# Patient Record
Sex: MALE | Race: WHITE | Employment: UNEMPLOYED | URBAN - METROPOLITAN AREA
[De-identification: names, ages, dates, MRNs, and addresses within clinical notes are randomized per-mention and may not be internally consistent; named-entity substitution may affect disease eponyms.]

---

## 2017-02-06 PROBLEM — N17.9 ACUTE RENAL FAILURE (ARF) (HCC): Status: ACTIVE | Noted: 2017-02-06

## 2022-11-07 ENCOUNTER — HOSPITAL ENCOUNTER (INPATIENT)
Age: 52
LOS: 2 days | Discharge: HOME | DRG: 192 | End: 2022-11-09
Attending: EMERGENCY MEDICINE | Admitting: INTERNAL MEDICINE
Payer: MEDICAID

## 2022-11-07 ENCOUNTER — APPOINTMENT (OUTPATIENT)
Dept: CT IMAGING | Age: 52
DRG: 192 | End: 2022-11-07
Payer: MEDICAID

## 2022-11-07 ENCOUNTER — APPOINTMENT (OUTPATIENT)
Dept: GENERAL RADIOLOGY | Age: 52
DRG: 192 | End: 2022-11-07
Payer: MEDICAID

## 2022-11-07 DIAGNOSIS — E87.20 LACTIC ACIDOSIS: ICD-10-CM

## 2022-11-07 DIAGNOSIS — I21.4 NSTEMI (NON-ST ELEVATED MYOCARDIAL INFARCTION) (HCC): ICD-10-CM

## 2022-11-07 DIAGNOSIS — D72.829 LEUKOCYTOSIS, UNSPECIFIED TYPE: ICD-10-CM

## 2022-11-07 DIAGNOSIS — R41.82 ALTERED MENTAL STATUS, UNSPECIFIED ALTERED MENTAL STATUS TYPE: Primary | ICD-10-CM

## 2022-11-07 PROBLEM — R55 SYNCOPE AND COLLAPSE: Status: ACTIVE | Noted: 2022-11-07

## 2022-11-07 LAB
ACANTHOCYTES: ABNORMAL
ACETAMINOPHEN LEVEL: <5 MCG/ML (ref 10–30)
ALBUMIN SERPL-MCNC: 5.3 G/DL (ref 3.5–5.2)
ALP BLD-CCNC: 116 U/L (ref 40–129)
ALT SERPL-CCNC: 18 U/L (ref 0–40)
AMPHETAMINE SCREEN, URINE: NOT DETECTED
ANION GAP SERPL CALCULATED.3IONS-SCNC: 22 MMOL/L (ref 7–16)
ANISOCYTOSIS: ABNORMAL
ANISOCYTOSIS: ABNORMAL
AST SERPL-CCNC: 23 U/L (ref 0–39)
BARBITURATE SCREEN URINE: NOT DETECTED
BASOPHILS ABSOLUTE: 0 E9/L (ref 0–0.2)
BASOPHILS ABSOLUTE: 0.04 E9/L (ref 0–0.2)
BASOPHILS RELATIVE PERCENT: 0.2 % (ref 0–2)
BASOPHILS RELATIVE PERCENT: 0.4 % (ref 0–2)
BENZODIAZEPINE SCREEN, URINE: NOT DETECTED
BETA-HYDROXYBUTYRATE: 0.86 MMOL/L (ref 0.02–0.27)
BILIRUB SERPL-MCNC: <0.2 MG/DL (ref 0–1.2)
BUN BLDV-MCNC: 24 MG/DL (ref 6–20)
BURR CELLS: ABNORMAL
BURR CELLS: ABNORMAL
CALCIUM SERPL-MCNC: 10.3 MG/DL (ref 8.6–10.2)
CANNABINOID SCREEN URINE: POSITIVE
CHLORIDE BLD-SCNC: 103 MMOL/L (ref 98–107)
CO2: 20 MMOL/L (ref 22–29)
COCAINE METABOLITE SCREEN URINE: POSITIVE
CREAT SERPL-MCNC: 1.2 MG/DL (ref 0.7–1.2)
EOSINOPHILS ABSOLUTE: 0 E9/L (ref 0.05–0.5)
EOSINOPHILS ABSOLUTE: 0.01 E9/L (ref 0.05–0.5)
EOSINOPHILS RELATIVE PERCENT: 0 % (ref 0–6)
EOSINOPHILS RELATIVE PERCENT: 0.2 % (ref 0–6)
ETHANOL: <10 MG/DL (ref 0–0.08)
FENTANYL SCREEN, URINE: POSITIVE
GFR SERPL CREATININE-BSD FRML MDRD: >60 ML/MIN/1.73
GLUCOSE BLD-MCNC: 199 MG/DL (ref 74–99)
HCT VFR BLD CALC: 43.8 % (ref 37–54)
HCT VFR BLD CALC: 48.8 % (ref 37–54)
HEMOGLOBIN: 14.8 G/DL (ref 12.5–16.5)
HEMOGLOBIN: 15.9 G/DL (ref 12.5–16.5)
HOWELL-JOLLY BODIES: ABNORMAL
IMMATURE GRANULOCYTES #: 0.2 E9/L
IMMATURE GRANULOCYTES %: 0.9 % (ref 0–5)
LACTIC ACID, SEPSIS: 11.1 MMOL/L (ref 0.5–1.9)
LACTIC ACID, SEPSIS: 2 MMOL/L (ref 0.5–1.9)
LACTIC ACID: 1.7 MMOL/L (ref 0.5–2.2)
LYMPHOCYTES ABSOLUTE: 1.02 E9/L (ref 1.5–4)
LYMPHOCYTES ABSOLUTE: 2.55 E9/L (ref 1.5–4)
LYMPHOCYTES RELATIVE PERCENT: 4.6 % (ref 20–42)
LYMPHOCYTES RELATIVE PERCENT: 9.6 % (ref 20–42)
Lab: ABNORMAL
MAGNESIUM: 2.3 MG/DL (ref 1.6–2.6)
MCH RBC QN AUTO: 32 PG (ref 26–35)
MCH RBC QN AUTO: 32.4 PG (ref 26–35)
MCHC RBC AUTO-ENTMCNC: 32.6 % (ref 32–34.5)
MCHC RBC AUTO-ENTMCNC: 33.8 % (ref 32–34.5)
MCV RBC AUTO: 94.6 FL (ref 80–99.9)
MCV RBC AUTO: 99.4 FL (ref 80–99.9)
METHADONE SCREEN, URINE: NOT DETECTED
MONOCYTES ABSOLUTE: 1.52 E9/L (ref 0.1–0.95)
MONOCYTES ABSOLUTE: 2.04 E9/L (ref 0.1–0.95)
MONOCYTES RELATIVE PERCENT: 6.8 % (ref 2–12)
MONOCYTES RELATIVE PERCENT: 7.8 % (ref 2–12)
NEUTROPHILS ABSOLUTE: 19.42 E9/L (ref 1.8–7.3)
NEUTROPHILS ABSOLUTE: 21.17 E9/L (ref 1.8–7.3)
NEUTROPHILS RELATIVE PERCENT: 82.6 % (ref 43–80)
NEUTROPHILS RELATIVE PERCENT: 87.5 % (ref 43–80)
OPIATE SCREEN URINE: NOT DETECTED
OSMOLALITY: 305 MOSM/KG (ref 285–310)
OVALOCYTES: ABNORMAL
OXYCODONE URINE: NOT DETECTED
PAPPENHEIMER BODIES: ABNORMAL
PDW BLD-RTO: 15 FL (ref 11.5–15)
PDW BLD-RTO: 15.2 FL (ref 11.5–15)
PHENCYCLIDINE SCREEN URINE: NOT DETECTED
PLATELET # BLD: 379 E9/L (ref 130–450)
PLATELET # BLD: 430 E9/L (ref 130–450)
PMV BLD AUTO: 9.1 FL (ref 7–12)
PMV BLD AUTO: 9.9 FL (ref 7–12)
POIKILOCYTES: ABNORMAL
POIKILOCYTES: ABNORMAL
POTASSIUM REFLEX MAGNESIUM: 4.6 MMOL/L (ref 3.5–5)
PROCALCITONIN: 0.78 NG/ML (ref 0–0.08)
RBC # BLD: 4.63 E12/L (ref 3.8–5.8)
RBC # BLD: 4.91 E12/L (ref 3.8–5.8)
SALICYLATE, SERUM: <0.3 MG/DL (ref 0–30)
SODIUM BLD-SCNC: 145 MMOL/L (ref 132–146)
TARGET CELLS: ABNORMAL
TARGET CELLS: ABNORMAL
TOTAL CK: 189 U/L (ref 20–200)
TOTAL PROTEIN: 8.7 G/DL (ref 6.4–8.3)
TRICYCLIC ANTIDEPRESSANTS SCREEN SERUM: NEGATIVE NG/ML
TROPONIN, HIGH SENSITIVITY: 121 NG/L (ref 0–11)
TROPONIN, HIGH SENSITIVITY: 79 NG/L (ref 0–11)
TSH SERPL DL<=0.05 MIU/L-ACNC: 0.43 UIU/ML (ref 0.27–4.2)
WBC # BLD: 22.2 E9/L (ref 4.5–11.5)
WBC # BLD: 25.5 E9/L (ref 4.5–11.5)

## 2022-11-07 PROCEDURE — 6370000000 HC RX 637 (ALT 250 FOR IP): Performed by: INTERNAL MEDICINE

## 2022-11-07 PROCEDURE — 6360000002 HC RX W HCPCS: Performed by: EMERGENCY MEDICINE

## 2022-11-07 PROCEDURE — 2140000000 HC CCU INTERMEDIATE R&B

## 2022-11-07 PROCEDURE — 83605 ASSAY OF LACTIC ACID: CPT

## 2022-11-07 PROCEDURE — 84145 PROCALCITONIN (PCT): CPT

## 2022-11-07 PROCEDURE — 80179 DRUG ASSAY SALICYLATE: CPT

## 2022-11-07 PROCEDURE — 6360000002 HC RX W HCPCS: Performed by: INTERNAL MEDICINE

## 2022-11-07 PROCEDURE — 80053 COMPREHEN METABOLIC PANEL: CPT

## 2022-11-07 PROCEDURE — 80143 DRUG ASSAY ACETAMINOPHEN: CPT

## 2022-11-07 PROCEDURE — 93005 ELECTROCARDIOGRAM TRACING: CPT | Performed by: EMERGENCY MEDICINE

## 2022-11-07 PROCEDURE — APPSS60 APP SPLIT SHARED TIME 46-60 MINUTES: Performed by: CLINICAL NURSE SPECIALIST

## 2022-11-07 PROCEDURE — 82550 ASSAY OF CK (CPK): CPT

## 2022-11-07 PROCEDURE — 83930 ASSAY OF BLOOD OSMOLALITY: CPT

## 2022-11-07 PROCEDURE — 70450 CT HEAD/BRAIN W/O DYE: CPT

## 2022-11-07 PROCEDURE — 93005 ELECTROCARDIOGRAM TRACING: CPT | Performed by: INTERNAL MEDICINE

## 2022-11-07 PROCEDURE — 99223 1ST HOSP IP/OBS HIGH 75: CPT | Performed by: INTERNAL MEDICINE

## 2022-11-07 PROCEDURE — 71045 X-RAY EXAM CHEST 1 VIEW: CPT

## 2022-11-07 PROCEDURE — 36415 COLL VENOUS BLD VENIPUNCTURE: CPT

## 2022-11-07 PROCEDURE — 84443 ASSAY THYROID STIM HORMONE: CPT

## 2022-11-07 PROCEDURE — 82010 KETONE BODYS QUAN: CPT

## 2022-11-07 PROCEDURE — 85025 COMPLETE CBC W/AUTO DIFF WBC: CPT

## 2022-11-07 PROCEDURE — 80307 DRUG TEST PRSMV CHEM ANLYZR: CPT

## 2022-11-07 PROCEDURE — 96361 HYDRATE IV INFUSION ADD-ON: CPT

## 2022-11-07 PROCEDURE — 87081 CULTURE SCREEN ONLY: CPT

## 2022-11-07 PROCEDURE — 82077 ASSAY SPEC XCP UR&BREATH IA: CPT

## 2022-11-07 PROCEDURE — 84484 ASSAY OF TROPONIN QUANT: CPT

## 2022-11-07 PROCEDURE — 99285 EMERGENCY DEPT VISIT HI MDM: CPT

## 2022-11-07 PROCEDURE — 87040 BLOOD CULTURE FOR BACTERIA: CPT

## 2022-11-07 PROCEDURE — 2580000003 HC RX 258: Performed by: INTERNAL MEDICINE

## 2022-11-07 PROCEDURE — 83735 ASSAY OF MAGNESIUM: CPT

## 2022-11-07 PROCEDURE — 96365 THER/PROPH/DIAG IV INF INIT: CPT

## 2022-11-07 PROCEDURE — 2580000003 HC RX 258: Performed by: EMERGENCY MEDICINE

## 2022-11-07 RX ORDER — SODIUM CHLORIDE 9 MG/ML
INJECTION, SOLUTION INTRAVENOUS PRN
Status: DISCONTINUED | OUTPATIENT
Start: 2022-11-07 | End: 2022-11-09 | Stop reason: HOSPADM

## 2022-11-07 RX ORDER — ACETAMINOPHEN 325 MG/1
650 TABLET ORAL EVERY 6 HOURS PRN
Status: DISCONTINUED | OUTPATIENT
Start: 2022-11-07 | End: 2022-11-08 | Stop reason: SDUPTHER

## 2022-11-07 RX ORDER — SODIUM CHLORIDE 0.9 % (FLUSH) 0.9 %
10 SYRINGE (ML) INJECTION PRN
Status: DISCONTINUED | OUTPATIENT
Start: 2022-11-07 | End: 2022-11-09 | Stop reason: HOSPADM

## 2022-11-07 RX ORDER — LANOLIN ALCOHOL/MO/W.PET/CERES
100 CREAM (GRAM) TOPICAL DAILY
Status: DISCONTINUED | OUTPATIENT
Start: 2022-11-07 | End: 2022-11-09 | Stop reason: HOSPADM

## 2022-11-07 RX ORDER — SODIUM CHLORIDE 0.9 % (FLUSH) 0.9 %
5-40 SYRINGE (ML) INJECTION EVERY 12 HOURS SCHEDULED
Status: DISCONTINUED | OUTPATIENT
Start: 2022-11-07 | End: 2022-11-07 | Stop reason: SDUPTHER

## 2022-11-07 RX ORDER — DEXTROSE MONOHYDRATE 100 MG/ML
INJECTION, SOLUTION INTRAVENOUS CONTINUOUS PRN
Status: DISCONTINUED | OUTPATIENT
Start: 2022-11-07 | End: 2022-11-09 | Stop reason: HOSPADM

## 2022-11-07 RX ORDER — SODIUM CHLORIDE 9 MG/ML
INJECTION, SOLUTION INTRAVENOUS PRN
Status: DISCONTINUED | OUTPATIENT
Start: 2022-11-07 | End: 2022-11-07 | Stop reason: SDUPTHER

## 2022-11-07 RX ORDER — 0.9 % SODIUM CHLORIDE 0.9 %
1000 INTRAVENOUS SOLUTION INTRAVENOUS ONCE
Status: COMPLETED | OUTPATIENT
Start: 2022-11-07 | End: 2022-11-07

## 2022-11-07 RX ORDER — POLYETHYLENE GLYCOL 3350 17 G/17G
17 POWDER, FOR SOLUTION ORAL DAILY PRN
Status: DISCONTINUED | OUTPATIENT
Start: 2022-11-07 | End: 2022-11-09 | Stop reason: HOSPADM

## 2022-11-07 RX ORDER — ONDANSETRON 2 MG/ML
4 INJECTION INTRAMUSCULAR; INTRAVENOUS EVERY 6 HOURS PRN
Status: DISCONTINUED | OUTPATIENT
Start: 2022-11-07 | End: 2022-11-09 | Stop reason: HOSPADM

## 2022-11-07 RX ORDER — ENOXAPARIN SODIUM 100 MG/ML
40 INJECTION SUBCUTANEOUS DAILY
Status: DISCONTINUED | OUTPATIENT
Start: 2022-11-07 | End: 2022-11-09 | Stop reason: HOSPADM

## 2022-11-07 RX ORDER — ACETAMINOPHEN 650 MG/1
650 SUPPOSITORY RECTAL EVERY 6 HOURS PRN
Status: DISCONTINUED | OUTPATIENT
Start: 2022-11-07 | End: 2022-11-09 | Stop reason: HOSPADM

## 2022-11-07 RX ORDER — SODIUM CHLORIDE 0.9 % (FLUSH) 0.9 %
10 SYRINGE (ML) INJECTION EVERY 12 HOURS SCHEDULED
Status: DISCONTINUED | OUTPATIENT
Start: 2022-11-07 | End: 2022-11-09 | Stop reason: HOSPADM

## 2022-11-07 RX ORDER — SODIUM CHLORIDE, SODIUM LACTATE, POTASSIUM CHLORIDE, AND CALCIUM CHLORIDE .6; .31; .03; .02 G/100ML; G/100ML; G/100ML; G/100ML
30 INJECTION, SOLUTION INTRAVENOUS ONCE
Status: DISCONTINUED | OUTPATIENT
Start: 2022-11-07 | End: 2022-11-09 | Stop reason: HOSPADM

## 2022-11-07 RX ORDER — PROMETHAZINE HYDROCHLORIDE 12.5 MG/1
12.5 TABLET ORAL EVERY 6 HOURS PRN
Status: DISCONTINUED | OUTPATIENT
Start: 2022-11-07 | End: 2022-11-09 | Stop reason: HOSPADM

## 2022-11-07 RX ORDER — IPRATROPIUM BROMIDE AND ALBUTEROL SULFATE 2.5; .5 MG/3ML; MG/3ML
1 SOLUTION RESPIRATORY (INHALATION)
Status: DISCONTINUED | OUTPATIENT
Start: 2022-11-07 | End: 2022-11-09 | Stop reason: HOSPADM

## 2022-11-07 RX ORDER — SODIUM CHLORIDE 0.9 % (FLUSH) 0.9 %
5-40 SYRINGE (ML) INJECTION PRN
Status: DISCONTINUED | OUTPATIENT
Start: 2022-11-07 | End: 2022-11-07 | Stop reason: SDUPTHER

## 2022-11-07 RX ADMIN — SODIUM CHLORIDE, PRESERVATIVE FREE 10 ML: 5 INJECTION INTRAVENOUS at 21:15

## 2022-11-07 RX ADMIN — Medication 100 MG: at 18:51

## 2022-11-07 RX ADMIN — MEROPENEM 1000 MG: 1 INJECTION, POWDER, FOR SOLUTION INTRAVENOUS at 21:16

## 2022-11-07 RX ADMIN — ENOXAPARIN SODIUM 40 MG: 100 INJECTION SUBCUTANEOUS at 18:51

## 2022-11-07 RX ADMIN — SODIUM CHLORIDE 1000 ML: 9 INJECTION, SOLUTION INTRAVENOUS at 12:45

## 2022-11-07 RX ADMIN — MEROPENEM 1000 MG: 1 INJECTION, POWDER, FOR SOLUTION INTRAVENOUS at 14:06

## 2022-11-07 ASSESSMENT — PAIN SCALES - GENERAL
PAINLEVEL_OUTOF10: 0
PAINLEVEL_OUTOF10: 0

## 2022-11-07 ASSESSMENT — PAIN - FUNCTIONAL ASSESSMENT: PAIN_FUNCTIONAL_ASSESSMENT: NONE - DENIES PAIN

## 2022-11-07 NOTE — PROGRESS NOTES
Chrissie Angelucci  11/7/2022    HPI:  Found down outside house. He has no recollection. He denies chest pressure/pain, dyspnea or dizziness. Mother found him he was glassy eyed, confused. Physical Exam  Vitals reviewed. Constitutional:       General: He is not in acute distress. Appearance: He is normal weight. HENT:      Head: Normocephalic and atraumatic. Eyes:      Pupils: Pupils are equal, round, and reactive to light. Cardiovascular:      Rate and Rhythm: Normal rate and regular rhythm. Heart sounds: No murmur heard. Pulmonary:      Breath sounds: Wheezing and rhonchi present. Musculoskeletal:      Right lower leg: No edema. Left lower leg: No edema. Neurological:      General: No focal deficit present. Mental Status: He is alert and oriented to person, place, and time. Mental status is at baseline. Cranial Nerves: No cranial nerve deficit.        A:  Cardio-Pulmonary Collapse   Elevated troponin  Abnormal ECG  Hypercalcemia  Lactic Acidemia  Tobacco Use    P:  Serial troponin  Re-evaluate electrolytes  ECHO  Stress Test versus Heart Catheterization

## 2022-11-07 NOTE — PROGRESS NOTES
Patient states he is missing one shoe, call made down to ER to see if patient's show is there, it is not. Another call placed to transport department to see if the shoe is on the cart patient was brought up on. Patient and mother updated.

## 2022-11-07 NOTE — ED PROVIDER NOTES
Department of Emergency Medicine   ED  Provider Note  Admit Date/RoomTime: 11/7/2022 10:03 AM  ED Room: 6507/6507-A          History of Present Illness:  11/7/22, Time: 9:24 AM EST  Chief Complaint   Patient presents with    Alcohol Intoxication     Found outside by family pt admits to drinking stating he was sitting on front porch                Robel Brar is a 47 y.o. male presenting to the ED for being found unresponsive, beginning prior to arrival.  The complaint has been intermittent, severe in severity, and worsened by nothing. Says he had 3 shots and a beer. Says he also smoked marijuana. Says he then drove home. He remembers getting out of his car and that is it. He reports his mother found him outside today. He denies toxic alcohol use. He denies chest pain or shortness of breath. No abdominal pain. Denies cardiac history. Denies recent illness. No family hx of sudden cardiac death or syncooe    Review of Systems:   A complete review of systems was performed and pertinent positives and negatives are stated within HPI, all other systems reviewed and are negative.        --------------------------------------------- PAST HISTORY ---------------------------------------------  Past Medical History:  has a past medical history of Chronic back pain and Herniated disc. Past Surgical History:  has a past surgical history that includes hernia repair (1974); laminectomy (10/11/2011); and other surgical history (2/9/16). Social History:  reports that he has been smoking cigars. He has been smoking an average of .25 packs per day. He has never used smokeless tobacco. He reports current alcohol use of about 2.0 standard drinks per week. He reports that he does not use drugs. Family History: family history includes Diabetes in his father; Heart Disease in his mother; High Blood Pressure in his father and mother; Thyroid Disease in his mother. . Unless otherwise noted, family history is non contributory    The patients home medications have been reviewed. Allergies: Penicillins    I have reviewed the past medical history, past surgical history, social history, and family history    ---------------------------------------------------PHYSICAL EXAM--------------------------------------    Constitutional/General: Alert and oriented x3  Head: Normocephalic and atraumatic  Eyes: PERRL, EOMI, sclera non icteric  ENT: Oropharynx clear, handling secretions, no trismus, no asymmetry of the posterior oropharynx or uvular edema  Neck: Supple, full ROM, no stridor, no meningeal signs  Respiratory: Lungs clear to auscultation bilaterally, no wheezes, rales, or rhonchi. Not in respiratory distress  Cardiovascular:  Regular rate. Regular rhythm. No murmurs, no gallops, no rubs. 2+ distal pulses. Equal extremity pulses. Gastrointestinal:  Abdomen Soft, Non tender, Non distended. No rebound, guarding, or rigidity. No pulsatile masses. Musculoskeletal: Moves all extremities x 4. Warm and well perfused, no clubbing, no cyanosis, no edema. Capillary refill <3 seconds  Skin: skin warm and dry. No rashes. Neurologic: GCS 15, no focal deficits, symmetric strength 5/5 in the upper and lower extremities bilaterally. He is able to lift both legs and both arms. He is able to stand up. He has palpable pulses in his feet. Neurovascularly intact distally. 2+ DP/PT pulses. Capillary refill <3 secondary. Warm and well perfused. Sural, saphenous, deep peroneal, peroneal, and tibial nerves intact. Sensation intact. 5/5 strength. Achilies tendon intact. No evidence of compartment syndrome. No pain along the buttocks. No tenderness along the thigh or calf. No headache, no neck pain. No back pain. No chest pain or difficulty breathing     Psychiatric: Normal Affect          -------------------------------------------------- RESULTS -------------------------------------------------  Results are listed below.      LABS: (Lab results interpreted by me)  Results for orders placed or performed during the hospital encounter of 11/07/22   CBC with Auto Differential   Result Value Ref Range    WBC 25.5 (H) 4.5 - 11.5 E9/L    RBC 4.91 3.80 - 5.80 E12/L    Hemoglobin 15.9 12.5 - 16.5 g/dL    Hematocrit 48.8 37.0 - 54.0 %    MCV 99.4 80.0 - 99.9 fL    MCH 32.4 26.0 - 35.0 pg    MCHC 32.6 32.0 - 34.5 %    RDW 15.2 (H) 11.5 - 15.0 fL    Platelets 229 072 - 663 E9/L    MPV 9.9 7.0 - 12.0 fL    Neutrophils % 82.6 (H) 43.0 - 80.0 %    Lymphocytes % 9.6 (L) 20.0 - 42.0 %    Monocytes % 7.8 2.0 - 12.0 %    Eosinophils % 0.2 0.0 - 6.0 %    Basophils % 0.4 0.0 - 2.0 %    Neutrophils Absolute 21.17 (H) 1.80 - 7.30 E9/L    Lymphocytes Absolute 2.55 1.50 - 4.00 E9/L    Monocytes Absolute 2.04 (H) 0.10 - 0.95 E9/L    Eosinophils Absolute 0.00 (L) 0.05 - 0.50 E9/L    Basophils Absolute 0.00 0.00 - 0.20 E9/L    Anisocytosis 2+     Poikilocytes 1+     Sanchez Cells 1+     Ovalocytes 1+     Target Cells 1+    Comprehensive Metabolic Panel w/ Reflex to MG   Result Value Ref Range    Sodium 145 132 - 146 mmol/L    Potassium reflex Magnesium 4.6 3.5 - 5.0 mmol/L    Chloride 103 98 - 107 mmol/L    CO2 20 (L) 22 - 29 mmol/L    Anion Gap 22 (H) 7 - 16 mmol/L    Glucose 199 (H) 74 - 99 mg/dL    BUN 24 (H) 6 - 20 mg/dL    Creatinine 1.2 0.7 - 1.2 mg/dL    Est, Glom Filt Rate >60 >=60 mL/min/1.73    Calcium 10.3 (H) 8.6 - 10.2 mg/dL    Total Protein 8.7 (H) 6.4 - 8.3 g/dL    Albumin 5.3 (H) 3.5 - 5.2 g/dL    Total Bilirubin <0.2 0.0 - 1.2 mg/dL    Alkaline Phosphatase 116 40 - 129 U/L    ALT 18 0 - 40 U/L    AST 23 0 - 39 U/L   Urine Drug Screen   Result Value Ref Range    Amphetamine Screen, Urine NOT DETECTED Negative <1000 ng/mL    Barbiturate Screen, Ur NOT DETECTED Negative < 200 ng/mL    Benzodiazepine Screen, Urine NOT DETECTED Negative < 200 ng/mL    Cannabinoid Scrn, Ur POSITIVE (A) Negative < 50ng/mL    Cocaine Metabolite Screen, Urine POSITIVE (A) Negative < 300 ng/mL    Opiate Scrn, Ur NOT DETECTED Negative < 300ng/mL    PCP Screen, Urine NOT DETECTED Negative < 25 ng/mL    Methadone Screen, Urine NOT DETECTED Negative <300 ng/mL    Oxycodone Urine NOT DETECTED Negative <100 ng/mL    FENTANYL SCREEN, URINE POSITIVE (A) Negative <1 ng/mL    Drug Screen Comment: see below    Serum Drug Screen   Result Value Ref Range    Ethanol Lvl <10 mg/dL    Acetaminophen Level <5.0 (L) 10.0 - 31.5 mcg/mL    Salicylate, Serum <2.3 0.0 - 30.0 mg/dL    TCA Scrn NEGATIVE Cutoff:300 ng/mL   Lactate, Sepsis   Result Value Ref Range    Lactic Acid, Sepsis 11.1 (HH) 0.5 - 1.9 mmol/L   CK   Result Value Ref Range    Total  20 - 200 U/L   Lactate, Sepsis   Result Value Ref Range    Lactic Acid, Sepsis 2.0 (H) 0.5 - 1.9 mmol/L   Beta-Hydroxybutyrate   Result Value Ref Range    Beta-Hydroxybutyrate 0.86 (H) 0.02 - 0.27 mmol/L   Osmolality, Serum   Result Value Ref Range    Osmolality 305 285 - 310 mOsm/Kg   CBC with Auto Differential   Result Value Ref Range    WBC 22.2 (H) 4.5 - 11.5 E9/L    RBC 4.63 3.80 - 5.80 E12/L    Hemoglobin 14.8 12.5 - 16.5 g/dL    Hematocrit 43.8 37.0 - 54.0 %    MCV 94.6 80.0 - 99.9 fL    MCH 32.0 26.0 - 35.0 pg    MCHC 33.8 32.0 - 34.5 %    RDW 15.0 11.5 - 15.0 fL    Platelets 300 014 - 282 E9/L    MPV 9.1 7.0 - 12.0 fL    Neutrophils % 87.5 (H) 43.0 - 80.0 %    Immature Granulocytes % 0.9 0.0 - 5.0 %    Lymphocytes % 4.6 (L) 20.0 - 42.0 %    Monocytes % 6.8 2.0 - 12.0 %    Eosinophils % 0.0 0.0 - 6.0 %    Basophils % 0.2 0.0 - 2.0 %    Neutrophils Absolute 19.42 (H) 1.80 - 7.30 E9/L    Immature Granulocytes # 0.20 E9/L    Lymphocytes Absolute 1.02 (L) 1.50 - 4.00 E9/L    Monocytes Absolute 1.52 (H) 0.10 - 0.95 E9/L    Eosinophils Absolute 0.01 (L) 0.05 - 0.50 E9/L    Basophils Absolute 0.04 0.00 - 0.20 E9/L    Anisocytosis 1+     Poikilocytes 1+     Acanthocytes 1+     Sanchez Cells 1+     Target Cells 1+     Oneill-Jolly Bodies 2+     Pappenheimer Bodies 1+ Troponin   Result Value Ref Range    Troponin, High Sensitivity 121 (H) 0 - 11 ng/L   TSH   Result Value Ref Range    TSH 0.428 0.270 - 4.200 uIU/mL   Magnesium   Result Value Ref Range    Magnesium 2.3 1.6 - 2.6 mg/dL   Procalcitonin   Result Value Ref Range    Procalcitonin 0.78 (H) 0.00 - 0.08 ng/mL   EKG 12 Lead   Result Value Ref Range    Ventricular Rate 127 BPM    Atrial Rate 127 BPM    QRS Duration 78 ms    Q-T Interval 352 ms    QTc Calculation (Bazett) 511 ms    R Axis 76 degrees    T Axis 49 degrees   EKG 12 Lead   Result Value Ref Range    Ventricular Rate 89 BPM    Atrial Rate 89 BPM    P-R Interval 140 ms    QRS Duration 92 ms    Q-T Interval 398 ms    QTc Calculation (Bazett) 484 ms    P Axis 77 degrees    R Axis 75 degrees    T Axis 70 degrees   EKG 12 Lead   Result Value Ref Range    Ventricular Rate 72 BPM    Atrial Rate 72 BPM    P-R Interval 140 ms    QRS Duration 78 ms    Q-T Interval 390 ms    QTc Calculation (Bazett) 427 ms    P Axis 65 degrees    R Axis 51 degrees    T Axis 55 degrees   ,       RADIOLOGY:    Radiologist interpretation  CT HEAD WO CONTRAST   Final Result   No acute intracranial abnormality. Specifically, there is no acute   intracranial hemorrhage. XR CHEST PORTABLE   Final Result   No evidence of acute cardiopulmonary disease is seen.                EKG Interpretation  Interpreted by emergency department physician, Ankur Hearn MD    Date of EK22  Time: 930    Rhythm:  Poor quality EKG, atrial fibrillation versus sinus arrhythmia  Rate: tachycardia  Axis: normal  Conduction: normal  ST Segments: no acute change  T Waves: no acute change    Clinical Impression: Tachycardia versus atrial fibrillation, or quality EKG, unable to accurately determine the rhythm, will be repeated  Comparison to prior EKG: changes compared to previous EKG    EKG Interpretation  Interpreted by emergency department physician, Dr. Caio Acosta     22  Time: 0222    Rhythm: normal sinus   Rate: normal  Axis: normal  Conduction: normal  ST Segments: ST elevation with biphasic in V1 and V2  T Waves: no acute change    Clinical Impression: sinus with biphasic T waves in V1 and V2 with ST elevation concerning for possible Brugada syndrome  Comparison to Old EKG  changes from prior      ------------------------- NURSING NOTES AND VITALS REVIEWED ---------------------------   The nursing notes within the ED encounter and vital signs as below have been reviewed by myself  BP (!) 102/57   Pulse 81   Temp 97.6 °F (36.4 °C) (Temporal)   Resp 18   Ht 5' 9\" (1.753 m)   Wt 150 lb (68 kg)   SpO2 94%   BMI 22.15 kg/m²     Oxygen Saturation Interpretation: Normal    The patients available past medical records and past encounters were reviewed. ------------------------------ ED COURSE/MEDICAL DECISION MAKING----------------------         No cardiac monitor available today  38 admitted boarded patients and no portable monitors (also all in use). Oxygen Saturation Interpretation: 94 % on RA. I, Dr. Landy Bello, am the primary provider of record        Medical Decision Making:   EKG concerning for brugada  No fx of imaging  Wbc elevated, ?source, no clear signs of infection but 1 dose if antibiotics given.   Lactic improving  1 dose of antibiotics  Cardiology to evaluate  Internal medicine consulted for admission         Name and Route of medications administered in the ED:  Medications   lactated ringers bolus (2,040 mLs IntraVENous Not Given 11/7/22 1410)   perflutren lipid microspheres (DEFINITY) injection 1.5 mL (has no administration in time range)   sodium chloride flush 0.9 % injection 10 mL (10 mLs IntraVENous Given 11/7/22 2115)   sodium chloride flush 0.9 % injection 10 mL (has no administration in time range)   0.9 % sodium chloride infusion (has no administration in time range)   enoxaparin (LOVENOX) injection 40 mg (40 mg SubCUTAneous Given 11/7/22 1851)   promethazine (PHENERGAN) tablet 12.5 mg (has no administration in time range)     Or   ondansetron (ZOFRAN) injection 4 mg (has no administration in time range)   polyethylene glycol (GLYCOLAX) packet 17 g (has no administration in time range)   acetaminophen (TYLENOL) tablet 650 mg (has no administration in time range)     Or   acetaminophen (TYLENOL) suppository 650 mg (has no administration in time range)   ipratropium-albuterol (DUONEB) nebulizer solution 1 ampule (1 ampule Inhalation Not Given 11/7/22 2001)   glucose chewable tablet 16 g (has no administration in time range)   dextrose bolus 10% 125 mL (has no administration in time range)     Or   dextrose bolus 10% 250 mL (has no administration in time range)   glucagon (rDNA) injection 1 mg (has no administration in time range)   dextrose 10 % infusion (has no administration in time range)   thiamine tablet 100 mg (100 mg Oral Given 11/7/22 1851)   meropenem (MERREM) 1,000 mg in sodium chloride 0.9 % 100 mL IVPB (mini-bag) (1,000 mg IntraVENous New Bag 11/7/22 2116)   meropenem (MERREM) 1,000 mg in sodium chloride 0.9 % 100 mL IVPB (mini-bag) (0 mg IntraVENous Stopped 11/7/22 1618)   0.9 % sodium chloride bolus (0 mLs IntraVENous Stopped 11/7/22 1335)   0.9 % sodium chloride bolus (0 mLs IntraVENous Stopped 11/7/22 1421)              Re-Evaluations:       improving      This patient's ED course included: a personal history and physicial examination, re-evaluation prior to disposition, IV medications, and complex medical decision making and emergency management    This patient has remained hemodynamically stable during their ED course. Consultations:  Cardiology  Internal Medicine          Counseling: The emergency provider has spoken with the patient and discussed todays results, in addition to providing specific details for the plan of care and counseling regarding the diagnosis and prognosis. Questions are answered at this time and they are agreeable with the plan. --------------------------------- IMPRESSION AND DISPOSITION ---------------------------------    IMPRESSION  1. Altered mental status, unspecified altered mental status type    2. Lactic acidosis    3. Leukocytosis, unspecified type        DISPOSITION  Disposition: Admit to telemetry  Patient condition is stable        NOTE: This report was transcribed using voice recognition software.  Every effort was made to ensure accuracy; however, inadvertent computerized transcription errors may be present       Reanna Olmstead MD  11/07/22 5254

## 2022-11-07 NOTE — H&P
[address] [place] [address],[address]  Internal Medicine Residency Program  History and Physical    Patient:  Devika Ag 47 y.o. male MRN: [id number]     Date of Service: 11/7/2022    Hospital Day: 1      Chief complaint: had concerns including Alcohol Intoxication (Found outside by family pt admits to drinking stating he was sitting on front porch). History of Present Illness   The patient is a 47 y.o. male with past medical history of chronic back pain and herniated disc who presented to the ED after being found unresponsive prior to arrival.     Patient states he was drinking this morning with friends and watching TV. States he drove home and does remember the drive home and remembers getting out of his car but after that he does not recall anything. Patient came to when he arrived to the ED. Patient was found by mother who is currently at bedside. Per mother, states patient was slumped over, but awake and making repetitive sounds. States patient was not making any sense and also making funny faces. Patient denies any tongue biting or bowel or bladder incontinence during this time. Patient admits to alcohol use, and also admits to cocaine use but 2-3 days prior. Denies other illicit drug use. States he takes no medications at home. Denies any family history of seizures. Denies chest pain shortness of breath headaches dizziness lightheadedness nausea vomiting or fevers or chills. Review of system was essentially negative at this time as patient's altered mental status had resolved and patient denies being in any pain. ED Course: CT head was unremarkable for acute intracranial abnormality chest x-ray showed no evidence of acute cardiopulmonary disease. Lab work-up was remarkable for a lactic acid of 11.1, repeat 2.0. Calcium was noted to be 10.3 and procalcitonin of 0.78. Urine drug screen was positive for cannabinoid cocaine and fentanyl. White blood cell count of 22.2.   Patient was tachycardic of 129 upon presentation in ED as well as elevated blood pressure of 166/99. Patient was admitted for further care management regarding episode of altered mental status with concerns for potential arrhythmia. ED Meds: Patient was given Merrem 1000 mg IV    ED Fluids: Patient was given 2 L bolus     Past Medical History:      Diagnosis Date    Chronic back pain     Herniated disc        Past Surgical History:        Procedure Laterality Date    HERNIA REPAIR  1974    LAMINECTOMY  10/11/2011    microlaminectomy, discectomy L4-5    OTHER SURGICAL HISTORY  2/9/16    right elbow open treatment of fracture dislocation radial head       Medications Prior to Admission:    Prior to Admission medications    Not on File       Allergies:  Penicillins    Social History:   TOBACCO:   reports that he has been smoking cigars. He has been smoking an average of .25 packs per day. He has never used smokeless tobacco.  ETOH:   reports current alcohol use of about 2.0 standard drinks per week.   OCCUPATION: n/a     Family History:       Problem Relation Age of Onset    Heart Disease Mother     Thyroid Disease Mother     High Blood Pressure Mother     High Blood Pressure Father     Diabetes Father        REVIEW OF SYSTEMS:    Follow-up as per HPI    Physical Exam   Vitals: /83   Pulse 81   Temp 97.7 °F (36.5 °C) (Temporal)   Resp 16   Ht 5' 9\" (1.753 m)   Wt 150 lb (68 kg)   SpO2 97%   BMI 22.15 kg/m²     General Appearance: alert and oriented to person, place and time, well-developed and well-nourished, in no acute distress  Skin: warm and dry, no rash or erythema  Head: normocephalic and atraumatic  Pulmonary/Chest: Wheezes present bilaterally in all lung fields  Cardiovascular: normal rate, normal S1 and S2, no gallops, intact distal pulses, and no carotid bruits  Abdomen: soft, non-tender, non-distended, normal bowel sounds, no masses or organomegaly  Extremities: no cyanosis and no clubbing  Musculoskeletal: normal range of motion, no joint swelling, deformity or tenderness  Neurologic: normal and speech normal, alert and oriented x4 with appropriate thought content  Labs and Imaging Studies   Basic Labs  Recent Labs     11/07/22  1005      K 4.6      CO2 20*   BUN 24*   CREATININE 1.2   GLUCOSE 199*   CALCIUM 10.3*       Recent Labs     11/07/22  1005 11/07/22  1239   WBC 25.5* 22.2*   RBC 4.91 4.63   HGB 15.9 14.8   HCT 48.8 43.8   MCV 99.4 94.6   MCH 32.4 32.0   MCHC 32.6 33.8   RDW 15.2* 15.0    379   MPV 9.9 9.1       CBC:   Lab Results   Component Value Date/Time    WBC 22.2 11/07/2022 12:39 PM    RBC 4.63 11/07/2022 12:39 PM    HGB 14.8 11/07/2022 12:39 PM    HCT 43.8 11/07/2022 12:39 PM    MCV 94.6 11/07/2022 12:39 PM    RDW 15.0 11/07/2022 12:39 PM     11/07/2022 12:39 PM     BMP:    Lab Results   Component Value Date/Time     11/07/2022 10:05 AM    K 4.6 11/07/2022 10:05 AM     11/07/2022 10:05 AM    CO2 20 11/07/2022 10:05 AM    BUN 24 11/07/2022 10:05 AM       Imaging Studies:     CT HEAD WO CONTRAST    Result Date: 11/7/2022  EXAMINATION: CT OF THE HEAD WITHOUT CONTRAST  11/7/2022 11:11 am TECHNIQUE: CT of the head was performed without the administration of intravenous contrast. Automated exposure control, iterative reconstruction, and/or weight based adjustment of the mA/kV was utilized to reduce the radiation dose to as low as reasonably achievable. COMPARISON: None. HISTORY: ORDERING SYSTEM PROVIDED HISTORY: altered mental status TECHNOLOGIST PROVIDED HISTORY: Has a \"code stroke\" or \"stroke alert\" been called? ->No Reason for exam:->altered mental status Decision Support Exception - unselect if not a suspected or confirmed emergency medical condition->Emergency Medical Condition (MA) What reading provider will be dictating this exam?->CRC FINDINGS: BRAIN/VENTRICLES: There is no acute intracranial hemorrhage, mass effect or midline shift. No abnormal extra-axial fluid collection. The gray-white differentiation is maintained without evidence of an acute infarct. There is no evidence of hydrocephalus. ORBITS: The visualized portion of the orbits demonstrate no acute abnormality. SINUSES: The visualized paranasal sinuses and mastoid air cells demonstrate no acute abnormality. SOFT TISSUES/SKULL:  No acute abnormality of the visualized skull or soft tissues. No acute intracranial abnormality. Specifically, there is no acute intracranial hemorrhage. XR CHEST PORTABLE    Result Date: 11/7/2022  EXAMINATION: ONE XRAY VIEW OF THE CHEST 11/7/2022 10:34 am COMPARISON: 02/05/2017 HISTORY: ORDERING SYSTEM PROVIDED HISTORY: altered mental status TECHNOLOGIST PROVIDED HISTORY: Reason for exam:->altered mental status What reading provider will be dictating this exam?->CRC FINDINGS: The cardiomediastinal silhouette is without acute process. The lungs are without acute focal process. There is no effusion or pneumothorax. The osseous structures are without acute process. No evidence of acute cardiopulmonary disease is seen. EKG: Initial EKG had poor quality,  patient was tachycardic, questionable A. fib versus sinus arrhythmia    The ED, repeat EKG was concerning for possible Brugada syndrome. Resident's Assessment and Plan     Andrea Mcdaniel is a 47 y.o. male with past medical history of chronic back pain and herniated disc who presented to the ED with chief complaint of altered mental status.     1.  Altered mental status  2/2 to arrhythmia versus seizure versus drug abuse versus syncope vs cardiopulmonary collapse versus infectious etiology--> resolved  Initial EKG in ED concerning for arrhythmia  Urine drug screen positive for fentanyl cannabinoid and cocaine  PLAN  Neurology was consulted for concerns of possible syncope versus seizure activity  Echo ordered follow results  Repeat EKG ordered follow results  Patient on seizure cautions, elevate head of bed, aspiration precautions in place  Continue telemetry monitoring for potential arrhythmias  Follow-up pancultures    2. Abnormal EKG ? questionable arrhythmia vs NSTEMI   Initial EKG concerning for possible Brugada syndrome in the ED  Troponin of 121 on admission  PLAN  Cardiology consulted follow recommendations--> no beta-blockers due to ongoing cocaine use, cardiac catheterization possible PCI tomorrow lactic acid improves  Cardiology, recommend consult with EP for Brugada syndrome and syncope  Follow echo results  Obtain orthostatics  Trend troponin every 6 hours  Follow repeat EKG    3.  HAGMA secondary to lactic acidosis  Lactic acid of 11.1 on admission, repeat of 2.0    4. Elevated troponin 2/2 demand ischemia   Troponin of 121 on admission  And troponin every 6 hours    5. Hypercalcemia   Hypercalcemia of 10.3 on admission  Continue to monitor BMP    6. Leukocytosis 2/2 reactive  Leukocytosis of 25.5 on admission, repeat of 22.2  Continue to monitor CBC    7. Tobacco use    9. Hyperglycemia with elevated beta hydroxybutyrate  Has no prior history of diabetes, patient reports feeling fine with no symptoms  Glucose on admission of 189  Anion gap of 22 on admission as well as beta hydroxybutyrate of 2.86  Urine analysis pending follow results      PT/OT evaluation: Not indicated  DVT prophylaxis/ GI prophylaxis: Lovenox/regular diet  Disposition: home +/- home health / SNF / Tomasa Goodwin / Anastasiia Francis DO, PGY-1   Attending physician: Dr. Jaiden Reynoso      The above-named patient was seen with the medical residents during working rounds today. Under a separate note my physical exam, diagnosis and plan have already been submitted. I attest to having performed the physical examination, diagnosis and plan per my note and the resident note.   Separate note was filed as resident note was not yet ready at the time that I started the document on this patient. Electronically signed by Mya Temple MD on 11/8/22 at 8:15 AM EST        .

## 2022-11-07 NOTE — PLAN OF CARE
Problem: Discharge Planning  Goal: Discharge to home or other facility with appropriate resources  Outcome: Progressing  Flowsheets (Taken 11/7/2022 4563)  Discharge to home or other facility with appropriate resources: Identify barriers to discharge with patient and caregiver

## 2022-11-07 NOTE — ED NOTES
Nurse to nurse called to Yunior Bailey, placed in transport     [facility], RN  11/07/22 [id number]

## 2022-11-07 NOTE — ED NOTES
This RN attempted to ambulate patient to the restroom. He was able to take 4 steps and had to get back in bed. He stated his left leg is numb and he feels he is going to fall.  Hx of sciatica     Rose Shah RN  11/07/22 [id number]

## 2022-11-08 ENCOUNTER — APPOINTMENT (OUTPATIENT)
Dept: CARDIAC CATH/INVASIVE PROCEDURES | Age: 52
DRG: 192 | End: 2022-11-08
Payer: MEDICAID

## 2022-11-08 PROBLEM — E87.20 LACTIC ACIDOSIS: Status: ACTIVE | Noted: 2022-11-08

## 2022-11-08 PROBLEM — R41.82 ALTERED MENTAL STATUS: Status: ACTIVE | Noted: 2022-11-08

## 2022-11-08 PROBLEM — F14.90 COCAINE USE: Status: ACTIVE | Noted: 2022-11-08

## 2022-11-08 PROBLEM — I49.8 BRUGADA SYNDROME: Status: ACTIVE | Noted: 2022-11-08

## 2022-11-08 LAB
ABO/RH: NORMAL
ALBUMIN SERPL-MCNC: 3.6 G/DL (ref 3.5–5.2)
ALP BLD-CCNC: 56 U/L (ref 40–129)
ALT SERPL-CCNC: 21 U/L (ref 0–40)
ANION GAP SERPL CALCULATED.3IONS-SCNC: 9 MMOL/L (ref 7–16)
ANTIBODY SCREEN: NORMAL
AST SERPL-CCNC: 50 U/L (ref 0–39)
BACTERIA: ABNORMAL /HPF
BASOPHILS ABSOLUTE: 0.02 E9/L (ref 0–0.2)
BASOPHILS RELATIVE PERCENT: 0.1 % (ref 0–2)
BILIRUB SERPL-MCNC: <0.2 MG/DL (ref 0–1.2)
BILIRUBIN DIRECT: <0.2 MG/DL (ref 0–0.3)
BILIRUBIN URINE: NEGATIVE
BILIRUBIN, INDIRECT: ABNORMAL MG/DL (ref 0–1)
BLOOD, URINE: NEGATIVE
BUN BLDV-MCNC: 15 MG/DL (ref 6–20)
CALCIUM SERPL-MCNC: 8.8 MG/DL (ref 8.6–10.2)
CHLORIDE BLD-SCNC: 105 MMOL/L (ref 98–107)
CLARITY: CLEAR
CO2: 25 MMOL/L (ref 22–29)
COLOR: YELLOW
CREAT SERPL-MCNC: 1.1 MG/DL (ref 0.7–1.2)
EKG ATRIAL RATE: 89 BPM
EKG P AXIS: 77 DEGREES
EKG P-R INTERVAL: 140 MS
EKG Q-T INTERVAL: 398 MS
EKG QRS DURATION: 92 MS
EKG QTC CALCULATION (BAZETT): 484 MS
EKG R AXIS: 75 DEGREES
EKG T AXIS: 70 DEGREES
EKG VENTRICULAR RATE: 89 BPM
EOSINOPHILS ABSOLUTE: 0.31 E9/L (ref 0.05–0.5)
EOSINOPHILS RELATIVE PERCENT: 2.1 % (ref 0–6)
EPITHELIAL CELLS, UA: ABNORMAL /HPF
GFR SERPL CREATININE-BSD FRML MDRD: >60 ML/MIN/1.73
GLUCOSE BLD-MCNC: 88 MG/DL (ref 74–99)
GLUCOSE URINE: NEGATIVE MG/DL
HCT VFR BLD CALC: 36.7 % (ref 37–54)
HEMOGLOBIN: 12.5 G/DL (ref 12.5–16.5)
IMMATURE GRANULOCYTES #: 0.06 E9/L
IMMATURE GRANULOCYTES %: 0.4 % (ref 0–5)
KETONES, URINE: ABNORMAL MG/DL
LACTIC ACID: 0.9 MMOL/L (ref 0.5–2.2)
LACTIC ACID: 1 MMOL/L (ref 0.5–2.2)
LACTIC ACID: 1.6 MMOL/L (ref 0.5–2.2)
LACTIC ACID: 2.8 MMOL/L (ref 0.5–2.2)
LEUKOCYTE ESTERASE, URINE: NEGATIVE
LV EF: 60 %
LVEF MODALITY: NORMAL
LYMPHOCYTES ABSOLUTE: 4.28 E9/L (ref 1.5–4)
LYMPHOCYTES RELATIVE PERCENT: 29 % (ref 20–42)
MCH RBC QN AUTO: 31.6 PG (ref 26–35)
MCHC RBC AUTO-ENTMCNC: 34.1 % (ref 32–34.5)
MCV RBC AUTO: 92.7 FL (ref 80–99.9)
METER GLUCOSE: 108 MG/DL (ref 74–99)
MONOCYTES ABSOLUTE: 0.94 E9/L (ref 0.1–0.95)
MONOCYTES RELATIVE PERCENT: 6.4 % (ref 2–12)
NEUTROPHILS ABSOLUTE: 9.15 E9/L (ref 1.8–7.3)
NEUTROPHILS RELATIVE PERCENT: 62 % (ref 43–80)
NITRITE, URINE: NEGATIVE
PDW BLD-RTO: 14.9 FL (ref 11.5–15)
PH UA: 6.5 (ref 5–9)
PLATELET # BLD: 315 E9/L (ref 130–450)
PMV BLD AUTO: 9.7 FL (ref 7–12)
POTASSIUM REFLEX MAGNESIUM: 4.2 MMOL/L (ref 3.5–5)
PROTEIN UA: NEGATIVE MG/DL
RBC # BLD: 3.96 E12/L (ref 3.8–5.8)
RBC UA: ABNORMAL /HPF (ref 0–2)
SODIUM BLD-SCNC: 139 MMOL/L (ref 132–146)
SPECIFIC GRAVITY UA: 1.01 (ref 1–1.03)
TOTAL PROTEIN: 6 G/DL (ref 6.4–8.3)
TROPONIN, HIGH SENSITIVITY: 80 NG/L (ref 0–11)
UROBILINOGEN, URINE: 0.2 E.U./DL
WBC # BLD: 14.8 E9/L (ref 4.5–11.5)
WBC UA: ABNORMAL /HPF (ref 0–5)

## 2022-11-08 PROCEDURE — 4A023N7 MEASUREMENT OF CARDIAC SAMPLING AND PRESSURE, LEFT HEART, PERCUTANEOUS APPROACH: ICD-10-PCS | Performed by: INTERNAL MEDICINE

## 2022-11-08 PROCEDURE — 86901 BLOOD TYPING SEROLOGIC RH(D): CPT

## 2022-11-08 PROCEDURE — 80076 HEPATIC FUNCTION PANEL: CPT

## 2022-11-08 PROCEDURE — 2140000000 HC CCU INTERMEDIATE R&B

## 2022-11-08 PROCEDURE — 82962 GLUCOSE BLOOD TEST: CPT

## 2022-11-08 PROCEDURE — 2580000003 HC RX 258: Performed by: INTERNAL MEDICINE

## 2022-11-08 PROCEDURE — 93010 ELECTROCARDIOGRAM REPORT: CPT | Performed by: INTERNAL MEDICINE

## 2022-11-08 PROCEDURE — 84484 ASSAY OF TROPONIN QUANT: CPT

## 2022-11-08 PROCEDURE — 86900 BLOOD TYPING SEROLOGIC ABO: CPT

## 2022-11-08 PROCEDURE — 86850 RBC ANTIBODY SCREEN: CPT

## 2022-11-08 PROCEDURE — 85025 COMPLETE CBC W/AUTO DIFF WBC: CPT

## 2022-11-08 PROCEDURE — 99233 SBSQ HOSP IP/OBS HIGH 50: CPT | Performed by: INTERNAL MEDICINE

## 2022-11-08 PROCEDURE — B2111ZZ FLUOROSCOPY OF MULTIPLE CORONARY ARTERIES USING LOW OSMOLAR CONTRAST: ICD-10-PCS | Performed by: INTERNAL MEDICINE

## 2022-11-08 PROCEDURE — 99223 1ST HOSP IP/OBS HIGH 75: CPT | Performed by: INTERNAL MEDICINE

## 2022-11-08 PROCEDURE — 2500000003 HC RX 250 WO HCPCS

## 2022-11-08 PROCEDURE — 6360000002 HC RX W HCPCS: Performed by: INTERNAL MEDICINE

## 2022-11-08 PROCEDURE — B2151ZZ FLUOROSCOPY OF LEFT HEART USING LOW OSMOLAR CONTRAST: ICD-10-PCS | Performed by: INTERNAL MEDICINE

## 2022-11-08 PROCEDURE — 2709999900 HC NON-CHARGEABLE SUPPLY

## 2022-11-08 PROCEDURE — 6370000000 HC RX 637 (ALT 250 FOR IP): Performed by: INTERNAL MEDICINE

## 2022-11-08 PROCEDURE — C1894 INTRO/SHEATH, NON-LASER: HCPCS

## 2022-11-08 PROCEDURE — 6360000002 HC RX W HCPCS

## 2022-11-08 PROCEDURE — 93458 L HRT ARTERY/VENTRICLE ANGIO: CPT

## 2022-11-08 PROCEDURE — C1769 GUIDE WIRE: HCPCS

## 2022-11-08 PROCEDURE — 80048 BASIC METABOLIC PNL TOTAL CA: CPT

## 2022-11-08 PROCEDURE — 83605 ASSAY OF LACTIC ACID: CPT

## 2022-11-08 PROCEDURE — 87088 URINE BACTERIA CULTURE: CPT

## 2022-11-08 PROCEDURE — 6370000000 HC RX 637 (ALT 250 FOR IP)

## 2022-11-08 PROCEDURE — 93458 L HRT ARTERY/VENTRICLE ANGIO: CPT | Performed by: INTERNAL MEDICINE

## 2022-11-08 PROCEDURE — 81001 URINALYSIS AUTO W/SCOPE: CPT

## 2022-11-08 PROCEDURE — 36415 COLL VENOUS BLD VENIPUNCTURE: CPT

## 2022-11-08 RX ORDER — SODIUM CHLORIDE 9 MG/ML
INJECTION, SOLUTION INTRAVENOUS CONTINUOUS
Status: ACTIVE | OUTPATIENT
Start: 2022-11-08 | End: 2022-11-08

## 2022-11-08 RX ORDER — ACETAMINOPHEN 325 MG/1
650 TABLET ORAL EVERY 4 HOURS PRN
Status: DISCONTINUED | OUTPATIENT
Start: 2022-11-08 | End: 2022-11-09 | Stop reason: HOSPADM

## 2022-11-08 RX ADMIN — MEROPENEM 1000 MG: 1 INJECTION, POWDER, FOR SOLUTION INTRAVENOUS at 21:07

## 2022-11-08 RX ADMIN — MEROPENEM 1000 MG: 1 INJECTION, POWDER, FOR SOLUTION INTRAVENOUS at 05:59

## 2022-11-08 RX ADMIN — SODIUM CHLORIDE, PRESERVATIVE FREE 10 ML: 5 INJECTION INTRAVENOUS at 21:04

## 2022-11-08 RX ADMIN — MEROPENEM 1000 MG: 1 INJECTION, POWDER, FOR SOLUTION INTRAVENOUS at 15:03

## 2022-11-08 RX ADMIN — SODIUM CHLORIDE: 9 INJECTION, SOLUTION INTRAVENOUS at 15:07

## 2022-11-08 RX ADMIN — Medication 100 MG: at 09:21

## 2022-11-08 RX ADMIN — SODIUM CHLORIDE, PRESERVATIVE FREE 10 ML: 5 INJECTION INTRAVENOUS at 09:23

## 2022-11-08 ASSESSMENT — PAIN SCALES - GENERAL
PAINLEVEL_OUTOF10: 0

## 2022-11-08 ASSESSMENT — ENCOUNTER SYMPTOMS: SHORTNESS OF BREATH: 0

## 2022-11-08 NOTE — PROGRESS NOTES
[facility]  Internal Medicine Residency Program  Progress Note - House Team     Patient:  Anabel Collins 47 y.o. male MRN: [id number]     Date of Service: 11/8/2022     CC: AMS   Overnight events: No acute events      Subjective     Patient was seen and examined this morning at bedside in no acute distress. Overnight no acute changes. Denies CP, SOB, HA, dizziness. EP consulted. NPO currently-- possible cath lab today per cardiology. Repeat EKG showing NSR with V1 and V2 ST elevations    Objective     Physical Exam:  Vitals: BP (!) 111/57   Pulse 76   Temp 97 °F (36.1 °C) (Temporal)   Resp 16   Ht 5' 9\" (1.753 m)   Wt 150 lb (68 kg)   SpO2 95%   BMI 22.15 kg/m²     I & O - 24hr: I/O this shift: In: 0   Out: 400 [Urine:400]   General Appearance: alert, appears stated age, cooperative, and no distress  HEENT:  Head: Normocephalic, no lesions, without obvious abnormality. Neck: no adenopathy and no JVD  Lung: clear to auscultation bilaterally  Heart: regular rate and rhythm, S1, S2 normal, no murmur, click, rub or gallop  Abdomen: soft, non-tender; bowel sounds normal; no masses,  no organomegaly  Extremities:  extremities normal, atraumatic, no cyanosis or edema  Musculokeletal: No joint swelling, no muscle tenderness. ROM normal in all joints of extremities.    Neurologic: Mental status: Alert, oriented, thought content appropriate    Subjective  Pertinent Labs & Imaging Studies     CBC:   Lab Results   Component Value Date/Time    WBC 14.8 11/08/2022 06:11 AM    RBC 3.96 11/08/2022 06:11 AM    HGB 12.5 11/08/2022 06:11 AM    HCT 36.7 11/08/2022 06:11 AM    MCV 92.7 11/08/2022 06:11 AM    MCH 31.6 11/08/2022 06:11 AM    MCHC 34.1 11/08/2022 06:11 AM    RDW 14.9 11/08/2022 06:11 AM     11/08/2022 06:11 AM    MPV 9.7 11/08/2022 06:11 AM     BMP:    Lab Results   Component Value Date/Time     11/08/2022 06:11 AM    K 4.2 11/08/2022 06:11 AM     11/08/2022 06:11 AM CO2 25 11/08/2022 06:11 AM    BUN 15 11/08/2022 06:11 AM    LABALBU 3.6 11/08/2022 06:11 AM    CREATININE 1.1 11/08/2022 06:11 AM    CALCIUM 8.8 11/08/2022 06:11 AM    GFRAA 41 02/05/2017 09:30 PM    LABGLOM >60 11/08/2022 06:11 AM    GLUCOSE 88 11/08/2022 06:11 AM     Last 3 Troponin:    Lab Results   Component Value Date/Time    TROPONINI <0.01 02/05/2017 09:30 PM     U/A:    Lab Results   Component Value Date/Time    COLORU Yellow 11/08/2022 06:00 AM    PROTEINU Negative 11/08/2022 06:00 AM    PHUR 6.5 11/08/2022 06:00 AM    WBCUA NONE 11/08/2022 06:00 AM    RBCUA NONE 11/08/2022 06:00 AM    BACTERIA NONE SEEN 11/08/2022 06:00 AM    CLARITYU Clear 11/08/2022 06:00 AM    SPECGRAV 1.015 11/08/2022 06:00 AM    LEUKOCYTESUR Negative 11/08/2022 06:00 AM    UROBILINOGEN 0.2 11/08/2022 06:00 AM    BILIRUBINUR Negative 11/08/2022 06:00 AM    BLOODU Negative 11/08/2022 06:00 AM    GLUCOSEU Negative 11/08/2022 06:00 AM       CT HEAD WO CONTRAST   Final Result   No acute intracranial abnormality. Specifically, there is no acute   intracranial hemorrhage. XR CHEST PORTABLE   Final Result   No evidence of acute cardiopulmonary disease is seen. Resident's Assessment and Plan     Assessment and Plan:    Juanis Cheney is a 47 y.o. male with past medical history of chronic back pain and herniated disc who presented to the ED with chief complaint of altered mental status. 1.  Altered mental status  2/2 to arrhythmia versus seizure versus drug abuse versus syncope vs cardiopulmonary collapse versus infectious etiology--> resolved  Initial EKG in ED concerning for arrhythmia  Urine drug screen positive for fentanyl cannabinoid and cocaine  PLAN  Echo ordered follow results  Repeat EKG ordered follow results-->NSR, ST elevation V1 and V2  Continue telemetry monitoring for potential arrhythmias  Follow-up pan cultures-- pending      2.  Abnormal EKG ? questionable arrhythmia vs NSTEMI   Initial EKG concerning for possible Brugada syndrome in the ED  Troponin of 121 on admission  PLAN  Cardiology consulted follow recommendations--> no beta-blockers due to ongoing cocaine use, cardiac catheterization possible PCI tomorrow lactic acid improves  Cardiology, recommend consult with EP for Brugada syndrome and syncope  Follow echo results  Trend troponin every 6 hours-- stopped  Follow repeat EKG--> NSR, ST elevation V1 and V2  EP consulted for concerns of brugada syndrome--> follow recs      3. HAGMA secondary to lactic acidosis  Lactic acid of 11.1 on admission, repeat of 1.6     4. Elevated troponin 2/2 demand ischemia   Troponin of 121 on admission--> trending down at 80 on 11/08  And troponin every 6 hours-- stopped     5. Hypercalcemia - resolved  Hypercalcemia of 10.3 on admission  Continue to monitor BMP     6. Leukocytosis 2/2 reactive - resolving  Leukocytosis of 25.5 on admission, repeat of 22.2--> 14.8   Continue to monitor CBC  On IV merrem for 5 days      7. Tobacco use     9.   Hyperglycemia with elevated beta hydroxybutyrate  Has no prior history of diabetes, patient reports feeling fine with no symptoms  Glucose on admission of 189  Anion gap of 22 on admission as well as beta hydroxybutyrate of 2.86  Urine analysis pending follow results-- trace ketones        PT/OT evaluation: Not indicated  DVT prophylaxis/ GI prophylaxis: Lovenox/regular diet  Disposition: continue current care      Everett Russell DO, PGY-1  Attending physician: Dr. Dragan Schumacher

## 2022-11-08 NOTE — PATIENT CARE CONFERENCE
P Quality Flow/Interdisciplinary Rounds Progress Note        Quality Flow Rounds held on November 8, 2022    Disciplines Attending:  Bedside Nurse, , , and Nursing Unit Leadership    Paolo Jenkins was admitted on 11/7/2022 10:03 AM    Anticipated Discharge Date:       Disposition:    Pierce Score:  Pierce Scale Score: 22    Readmission Risk              Risk of Unplanned Readmission:  9           Discussed patient goal for the day, patient clinical progression, and barriers to discharge.   The following Goal(s) of the Day/Commitment(s) have been identified:  Activity Progression  increase      Janel Haque RN  November 8, 2022

## 2022-11-08 NOTE — PROGRESS NOTES
Progress Note  Date:2022       IYMN:[id number]  Patient Name:Elmo Hayden     YOB: 1970     Age:52 y.o. Subjective    Subjective:  Symptoms:  No shortness of breath, chest pain, chest pressure or anxiety. Diet:  Adequate intake. Activity level: Normal.     Review of Systems   Constitutional:  Negative for activity change and fever. Respiratory:  Negative for shortness of breath. Cardiovascular:  Negative for chest pain. Objective         Vitals Last 24 Hours:  TEMPERATURE:  Temp  Av.6 °F (36.4 °C)  Min: 97 °F (36.1 °C)  Max: 97.9 °F (36.6 °C)  RESPIRATIONS RANGE: Resp  Av.2  Min: 16  Max: 20  PULSE OXIMETRY RANGE: SpO2  Av.2 %  Min: 94 %  Max: 98 %  PULSE RANGE: Pulse  Av.3  Min: 76  Max: 96  BLOOD PRESSURE RANGE: Systolic (10THY), YWX:548 , Min:102 , RHQ:518   ; Diastolic (92NKP), SMZ:51, Min:57, Max:87    I/O (24Hr): Intake/Output Summary (Last 24 hours) at 2022 1105  Last data filed at 2022 0748  Gross per 24 hour   Intake 649.2 ml   Output 925 ml   Net -275.8 ml     Objective:  General Appearance:  Comfortable. Vital signs: (most recent): Blood pressure (!) 111/57, pulse 76, temperature 97 °F (36.1 °C), temperature source Temporal, resp. rate 16, height 5' 9\" (1.753 m), weight 150 lb (68 kg), SpO2 95 %. Vital signs are normal.  No fever. Lungs:  Normal effort. No wheezes or rhonchi. Heart: Normal rate. Regular rhythm.     Labs/Imaging/Diagnostics    Labs:  CBC:  Recent Labs     22  1005 22  1239 22  0611   WBC 25.5* 22.2* 14.8*   RBC 4.91 4.63 3.96   HGB 15.9 14.8 12.5   HCT 48.8 43.8 36.7*   MCV 99.4 94.6 92.7   RDW 15.2* 15.0 14.9    379 315     CHEMISTRIES:  Recent Labs     22  1005 22  1239 22  0611     --  139   K 4.6  --  4.2     --  105   CO2 20*  --  25   BUN 24*  --  15   CREATININE 1.2  --  1.1   GLUCOSE 199*  --  88   MG  --  2.3  --      PT/INR:No results for Vascular Surgery Inpatient Consultation Note      Reason for Consultation: Dialysis access    HISTORY OF PRESENT ILLNESS:                The patient is a 66 y.o. male who is admitted to the hospital for treatment of agitation. The patient was at [facility] for treatment of metabolic encephalopathy and acute kidney injury associated with Covid infection. He had an NG tube for feedings and a temporary right femoral catheter for dialysis. The patient had been in restraints however his restraints were discontinued and he remove the NG tube and pulled out his femoral catheter. Bleeding was controlled with pressure. Vascular surgery is consulted for evaluation and treatment. IMPRESSION: Acute kidney injury. Delirium. RECOMMENDATIONS: We will plan to insert a tunneled dialysis catheter tomorrow. With the patient's delirium it will be difficult to place a temporary catheter without anesthesia.       Patient Active Problem List   Diagnosis Code    Hyperlipidemia E78.5    Prostate cancer ([facility] [address] C61    Hypertension I10    Diabetes mellitus ([facility] [address] E11.9    Shoulder pain, bilateral M25.511, M25.512    Abdominal pain, right lower quadrant R10.31    DKA, type 1, not at goal [facility]) E10.10    Acute metabolic encephalopathy G09.25    Acute pancreatitis K85.90    Acute renal failure (ARF) (HCC) N17.9    High anion gap metabolic acidosis H71.4    Complicated UTI (urinary tract infection) N39.0       Past Medical History:   Diagnosis Date    Cancer ([facility] [address]     Diabetes mellitus ([facility] [address]     Hyperlipidemia     Hypertension     Prostate cancer ([facility] [address]         Past Surgical History:   Procedure Laterality Date    COLON SURGERY      DILATATION, ESOPHAGUS      LEG SURGERY Left 6 years ago       Current Medications:    sodium chloride 100 mL/hr at 01/10/21 0600    dextrose        polyethylene glycol, senna, sodium chloride flush, promethazine **OR** ondansetron, polyethylene glycol, acetaminophen **OR** acetaminophen, glucose, dextrose, glucagon (rDNA), dextrose, sodium chloride flush    ascorbic acid  500 mg Oral Daily    metoprolol tartrate  25 mg Oral BID    pantoprazole  40 mg Oral Daily with breakfast    QUEtiapine  50 mg Oral BID    Vitamin D  2,000 Units Oral Daily    zinc sulfate  50 mg Oral Daily    sodium chloride flush  10 mL Intravenous 2 times per day    enoxaparin  30 mg Subcutaneous Daily    atorvastatin  10 mg Oral Nightly    insulin lispro  0-6 Units Subcutaneous 4x Daily AC & HS    insulin glargine  20 Units Subcutaneous Nightly    sodium chloride flush  10 mL Intravenous 2 times per day        Allergies:  Patient has no known allergies.     Social History     Socioeconomic History    Marital status: Single     Spouse name: Not on file    Number of children: Not on file    Years of education: Not on file    Highest education level: Not on file   Occupational History    Not on file   Social Needs    Financial resource strain: Not on file    Food insecurity     Worry: Not on file     Inability: Not on file    Transportation needs     Medical: Not on file     Non-medical: Not on file   Tobacco Use    Smoking status: Former Smoker     Packs/day: 1.00     Types: Cigarettes    Smokeless tobacco: Never Used   Substance and Sexual Activity    Alcohol use: Yes     Comment: \"daily shots\"    Drug use: No    Sexual activity: Not on file   Lifestyle    Physical activity     Days per week: Not on file     Minutes per session: Not on file    Stress: Not on file   Relationships    Social connections     Talks on phone: Not on file     Gets together: Not on file     Attends Yazdanism service: Not on file     Active member of club or organization: Not on file     Attends meetings of clubs or organizations: Not on file     Relationship status: Not on file    Intimate partner violence     Fear of current or ex partner: Not on file     Emotionally abused: Not on file     Physically abused: Not on file input(s): PROTIME, INR in the last 72 hours. APTT:No results for input(s): APTT in the last 72 hours. LIVER PROFILE:  Recent Labs     11/07/22  1005 11/08/22  0611   AST 23 50*   ALT 18 21   BILIDIR  --  <0.2   BILITOT <0.2 <0.2   ALKPHOS 116 56       Imaging Last 24 Hours:  CT HEAD WO CONTRAST    Result Date: 11/7/2022  EXAMINATION: CT OF THE HEAD WITHOUT CONTRAST  11/7/2022 11:11 am TECHNIQUE: CT of the head was performed without the administration of intravenous contrast. Automated exposure control, iterative reconstruction, and/or weight based adjustment of the mA/kV was utilized to reduce the radiation dose to as low as reasonably achievable. COMPARISON: None. HISTORY: ORDERING SYSTEM PROVIDED HISTORY: altered mental status TECHNOLOGIST PROVIDED HISTORY: Has a \"code stroke\" or \"stroke alert\" been called? ->No Reason for exam:->altered mental status Decision Support Exception - unselect if not a suspected or confirmed emergency medical condition->Emergency Medical Condition (MA) What reading provider will be dictating this exam?->CRC FINDINGS: BRAIN/VENTRICLES: There is no acute intracranial hemorrhage, mass effect or midline shift. No abnormal extra-axial fluid collection. The gray-white differentiation is maintained without evidence of an acute infarct. There is no evidence of hydrocephalus. ORBITS: The visualized portion of the orbits demonstrate no acute abnormality. SINUSES: The visualized paranasal sinuses and mastoid air cells demonstrate no acute abnormality. SOFT TISSUES/SKULL:  No acute abnormality of the visualized skull or soft tissues. No acute intracranial abnormality. Specifically, there is no acute intracranial hemorrhage.      XR CHEST PORTABLE    Result Date: 11/7/2022  EXAMINATION: ONE XRAY VIEW OF THE CHEST 11/7/2022 10:34 am COMPARISON: 02/05/2017 HISTORY: ORDERING SYSTEM PROVIDED HISTORY: altered mental status TECHNOLOGIST PROVIDED HISTORY: Reason for exam:->altered mental status Forced sexual activity: Not on file   Other Topics Concern    Not on file   Social History Narrative    Not on file        No family history on file. REVIEW OF SYSTEMS: Chart reviewed. Patient unable to contribute.     Eyes:      Blurred vision:  No [x]/Yes []               Diplopia:   No [x]/Yes []               Vision loss:       No [x]/Yes []   Ears, nose, throat:             Hearing loss:    No [x]/Yes []      Vertigo:   No [x]/Yes []                       Swallowing problem:  No [x]/Yes []               Nose bleeds:   No [x]/Yes []      Voice hoarseness:  No [x]/Yes []  Respiratory:             Cough:   No [x]/Yes []      Pleuritic chest pain:  No [x]/Yes []                        Dyspnea:   No [x]/Yes []      Wheezing:   No [x]/Yes []  Cardiovascular:             Angina:   No [x]/Yes []      Palpitations:   No [x]/Yes []          Claudication:    No [x]/Yes []      Leg swelling:   No [x]/Yes []  Gastrointestinal:             Nausea or vomiting:  No [x]/Yes []               Abdominal pain:  No [x]/Yes []                     Intestinal bleeding: No [x]/Yes []  Musculoskeletal:             Leg pain:   No [x]/Yes []      Back pain:   No [x]/Yes []                    Weakness:   No [x]/Yes []  Neurologic:             Numbness:   No [x]/Yes []      Paralysis:   No [x]/Yes []                       Headaches:   No [x]/Yes []  Hematologic, lymphatic:   Anemia:   No [x]/Yes []              Bleeding or bruising:  No [x]/Yes []              Fevers or chills: No [x]/Yes []  Endocrine:             Temp intolerance:   No [x]/Yes []                       Polydipsia, polyuria:  No [x]/Yes []  Skin:              Rash:    No [x]/Yes []      Ulcers:   No [x]/Yes []              Abnorm pigment: No [x]/Yes []  :              Frequency/urgency:  No [x]/Yes []      Hematuria:    No [x]/Yes []                      Incontinence:    No [x]/Yes []    PHYSICAL EXAM:  Vitals:    01/10/21 0745   BP: 117/66   Pulse: 86   Resp: 18 What reading provider will be dictating this exam?->CRC FINDINGS: The cardiomediastinal silhouette is without acute process. The lungs are without acute focal process. There is no effusion or pneumothorax. The osseous structures are without acute process. No evidence of acute cardiopulmonary disease is seen. Assessment//Plan           Hospital Problems             Last Modified POA    * (Principal) NSTEMI (non-ST elevated myocardial infarction) ([facility] [address] 11/7/2022 Yes    Syncope and collapse 11/7/2022 Yes     Assessment:    Condition: In stable condition. Unchanged. (Apparent non-ST  elevation MI  Intoxication with fentanyl and cocaine  Abnormal EKG suggestive of an arrhythmia/Brugada syndrome). Plan:   Consults: cardiology. Echocardiogram.   (Heart catheterization  Electro-physiology consult  A mariama discussion of risk of death with continued drug use was made today. ).      Electronically signed by Breanne Mcgowan MD on 11/8/22 at 11:05 AM EST Temp: 96.8 °F (36 °C)   SpO2: 98%     General Appearance: Patient lying on his side. Does not respond to questions, in no acute distress  Neurologic: unable to access  Head: normocephalic and atraumatic  Eyes: extraocular eye movements intact, conjunctivae normal  ENT: external ear and ear canal normal bilaterally, nose without deformity  Pulmonary/Chest: normal air movement, no respiratory distress  Cardiovascular: normal rate, regular rhythm  Abdomen: non-distended, no masses  Musculoskeletal: no joint deformity or tenderness  Extremities: no leg edema bilaterally, no swelling or bleeding from right groin temporary catheter site.   Skin: warm and dry, no rash or erythema    PULSE EXAM      Right      Left   Brachial     Radial     Femoral     Popliteal     Dorsalis Pedis     Posterior Tibial     (3=normal, 2=diminished, 1=barely palpable, 4=widened)      LABS:    Lab Results   Component Value Date    WBC 8.8 01/09/2021    HGB 10.7 (L) 01/09/2021    HCT 33.3 (L) 01/09/2021     01/09/2021    PROTIME 17.6 (H) 01/01/2021    INR 1.5 01/01/2021    K 4.8 01/10/2021    BUN 80 (H) 01/09/2021    CREATININE 4.0 (H) 01/09/2021       RADIOLOGY:

## 2022-11-08 NOTE — PROCEDURES
Procedure:    Left heart cath    Physician: Lorraine Rojas DO. Assistant: none    Indication: NSTEMI  AUC: 8  AUC indication: 4    Complication: None. Sedation: Intravenous Versed. Anesthesia: Xylocaine, fentanyl     Sedation time: I was present for sedation and ministration at 1402 and I ended sedation at 1429 for a total face-to-face sedation time of 27 minutes. Estimated blood loss: Minimal    Specimens: none    Contrast used: 140 cc    Hemodynamics:  Opening Aortic pressure: 025/22  LV systolic pressure: 871  LVEDP: 16  No significant gradient across the aortic valve    Angiographic Results/findings:  Left Main: Short. No angiographically significant stenosis. LAD: No angiographically significant stenosis. D1: No angiographically significant stenosis. D2: Small vessel. No angiographically significant stenosis. Cx: Codominant vessel. No angiographically significant stenosis. OM1: Large vessel. No angiographically significant stenosis. OM2: No angiographically significant stenosis. OM 3: Large vessel. No angiographically significant stenosis. Ramus: Absent  RCA: Takeoff high over the left coronary cusp. Co-dominant. No angiographically significant stenosis. PDA: No angiographically significant stenosis. LV: Normal LV size and systolic function. No wall motion abnormalities. Ejection fraction 60%    Procedure:   After obtaining informed consent the patient was taken to the cardiac Cath Lab where the area over the right radial artery was prepped and draped in a sterile fashion. Using ultrasound guidance and a micropuncture technique a 6 Belgian slender rain sheath was placed in the right radial artery. This was aspirated & flushed several times throughout the procedure. This was medicated with verapamil and nitroglycerin. They were given heparin systemically. A 5 Belgian L3 5 catheter was advanced over a wire to the root of the aorta. It was aspirated & flushed with saline. Pressures were obtained. It was filled with contrast.  This was then manipulated into the left main coronary artery. 4 orthogonal views were obtained. Multiple attempts were unsuccessful in locating the right coronary artery. An aortic root gram was performed and showed the takeoff of the right coronary artery high on the left side of the aorta. An LCB catheter was then manipulated into the right coronary artery and to orthogonal views were then obtained. A 5 Belarusian angled pigtail was then advanced & manipulated into the left ventricle. This was then aspirated & flushed with saline & pressures were obtained. An SOTO view was then obtained. The catheter was then aspirated & flushed with saline once again & pull back pressures were then obtained across the aortic vlave. The right radial artery sheath was removed and a vasc band was then placed with good patent hemostasis. They tolerated the procedure well with no complications. Note: This report was completed using computerized voice recognition software. Every effort has been made to ensure accuracy, however; and invert and computerized transcription errors may be present.

## 2022-11-08 NOTE — PLAN OF CARE
Problem: Discharge Planning  Goal: Discharge to home or other facility with appropriate resources  11/8/2022 0646 by Jennifer Lynn RN  Outcome: Progressing

## 2022-11-08 NOTE — CARE COORDINATION
Met with pt to discuss discharge planning/transition of care. Pt lives with mom, independent, active , and no DME. Pt has no PCP  and doesn't use any specific pharmacy. Pt was admitted after being found unresponsive. Pt was positive for cocaine, fentanyl, and marijuana, and was drinking alcohol the day of admission. Discussed Peer recovery, pt is agreeable for referral. Called #[phone] and spoke with [facility], gave her referral for peer recovery. Plan is home at discharge, per pt his family/friends to transport home. Pt currently on IV merrem q8. EP and cardio on board. ECHO and cardiac cath to be completed. Farrukh Grajeda, MSW, LSW

## 2022-11-09 ENCOUNTER — TELEPHONE (OUTPATIENT)
Dept: CARDIOLOGY CLINIC | Age: 52
End: 2022-11-09

## 2022-11-09 VITALS
DIASTOLIC BLOOD PRESSURE: 62 MMHG | WEIGHT: 150 LBS | HEART RATE: 78 BPM | RESPIRATION RATE: 18 BRPM | SYSTOLIC BLOOD PRESSURE: 125 MMHG | OXYGEN SATURATION: 99 % | HEIGHT: 69 IN | BODY MASS INDEX: 22.22 KG/M2 | TEMPERATURE: 97.4 F

## 2022-11-09 PROBLEM — I49.8 BRUGADA SYNDROME: Status: RESOLVED | Noted: 2022-11-08 | Resolved: 2022-11-09

## 2022-11-09 PROBLEM — R55 SYNCOPE AND COLLAPSE: Status: RESOLVED | Noted: 2022-11-07 | Resolved: 2022-11-09

## 2022-11-09 PROBLEM — E87.20 LACTIC ACIDOSIS: Status: RESOLVED | Noted: 2022-11-08 | Resolved: 2022-11-09

## 2022-11-09 PROBLEM — R41.82 ALTERED MENTAL STATUS: Status: RESOLVED | Noted: 2022-11-08 | Resolved: 2022-11-09

## 2022-11-09 LAB
ANION GAP SERPL CALCULATED.3IONS-SCNC: 8 MMOL/L (ref 7–16)
BASOPHILS ABSOLUTE: 0.07 E9/L (ref 0–0.2)
BASOPHILS RELATIVE PERCENT: 0.7 % (ref 0–2)
BUN BLDV-MCNC: 14 MG/DL (ref 6–20)
CALCIUM SERPL-MCNC: 9.1 MG/DL (ref 8.6–10.2)
CHLORIDE BLD-SCNC: 103 MMOL/L (ref 98–107)
CO2: 26 MMOL/L (ref 22–29)
CREAT SERPL-MCNC: 1.1 MG/DL (ref 0.7–1.2)
EOSINOPHILS ABSOLUTE: 0.29 E9/L (ref 0.05–0.5)
EOSINOPHILS RELATIVE PERCENT: 2.9 % (ref 0–6)
GFR SERPL CREATININE-BSD FRML MDRD: >60 ML/MIN/1.73
GLUCOSE BLD-MCNC: 81 MG/DL (ref 74–99)
HCT VFR BLD CALC: 39.1 % (ref 37–54)
HEMOGLOBIN: 13.7 G/DL (ref 12.5–16.5)
IMMATURE GRANULOCYTES #: 0.03 E9/L
IMMATURE GRANULOCYTES %: 0.3 % (ref 0–5)
LACTIC ACID: 0.7 MMOL/L (ref 0.5–2.2)
LV EF: 65 %
LVEF MODALITY: NORMAL
LYMPHOCYTES ABSOLUTE: 3.47 E9/L (ref 1.5–4)
LYMPHOCYTES RELATIVE PERCENT: 35.1 % (ref 20–42)
MCH RBC QN AUTO: 31.4 PG (ref 26–35)
MCHC RBC AUTO-ENTMCNC: 35 % (ref 32–34.5)
MCV RBC AUTO: 89.7 FL (ref 80–99.9)
MONOCYTES ABSOLUTE: 0.88 E9/L (ref 0.1–0.95)
MONOCYTES RELATIVE PERCENT: 8.9 % (ref 2–12)
MRSA CULTURE ONLY: NORMAL
NEUTROPHILS ABSOLUTE: 5.14 E9/L (ref 1.8–7.3)
NEUTROPHILS RELATIVE PERCENT: 52.1 % (ref 43–80)
PDW BLD-RTO: 14.4 FL (ref 11.5–15)
PLATELET # BLD: 338 E9/L (ref 130–450)
PMV BLD AUTO: 9.2 FL (ref 7–12)
POTASSIUM REFLEX MAGNESIUM: 4.1 MMOL/L (ref 3.5–5)
RBC # BLD: 4.36 E12/L (ref 3.8–5.8)
SODIUM BLD-SCNC: 137 MMOL/L (ref 132–146)
WBC # BLD: 9.9 E9/L (ref 4.5–11.5)

## 2022-11-09 PROCEDURE — 93306 TTE W/DOPPLER COMPLETE: CPT

## 2022-11-09 PROCEDURE — 85025 COMPLETE CBC W/AUTO DIFF WBC: CPT

## 2022-11-09 PROCEDURE — 99231 SBSQ HOSP IP/OBS SF/LOW 25: CPT | Performed by: INTERNAL MEDICINE

## 2022-11-09 PROCEDURE — 6360000002 HC RX W HCPCS: Performed by: INTERNAL MEDICINE

## 2022-11-09 PROCEDURE — 99232 SBSQ HOSP IP/OBS MODERATE 35: CPT | Performed by: INTERNAL MEDICINE

## 2022-11-09 PROCEDURE — 80048 BASIC METABOLIC PNL TOTAL CA: CPT

## 2022-11-09 PROCEDURE — 83605 ASSAY OF LACTIC ACID: CPT

## 2022-11-09 PROCEDURE — 93005 ELECTROCARDIOGRAM TRACING: CPT | Performed by: INTERNAL MEDICINE

## 2022-11-09 PROCEDURE — 36415 COLL VENOUS BLD VENIPUNCTURE: CPT

## 2022-11-09 PROCEDURE — 2580000003 HC RX 258: Performed by: INTERNAL MEDICINE

## 2022-11-09 PROCEDURE — 6370000000 HC RX 637 (ALT 250 FOR IP): Performed by: INTERNAL MEDICINE

## 2022-11-09 PROCEDURE — 99239 HOSP IP/OBS DSCHRG MGMT >30: CPT | Performed by: INTERNAL MEDICINE

## 2022-11-09 RX ORDER — ATORVASTATIN CALCIUM 20 MG/1
20 TABLET, FILM COATED ORAL DAILY
Qty: 30 TABLET | Refills: 1 | Status: SHIPPED | OUTPATIENT
Start: 2022-11-09

## 2022-11-09 RX ORDER — ASPIRIN 81 MG/1
81 TABLET ORAL DAILY
Qty: 30 TABLET | Refills: 1 | Status: SHIPPED | OUTPATIENT
Start: 2022-11-09

## 2022-11-09 RX ADMIN — Medication 100 MG: at 08:09

## 2022-11-09 RX ADMIN — SODIUM CHLORIDE, PRESERVATIVE FREE 10 ML: 5 INJECTION INTRAVENOUS at 08:10

## 2022-11-09 RX ADMIN — MEROPENEM 1000 MG: 1 INJECTION, POWDER, FOR SOLUTION INTRAVENOUS at 05:47

## 2022-11-09 ASSESSMENT — PAIN SCALES - GENERAL: PAINLEVEL_OUTOF10: 0

## 2022-11-09 NOTE — PROGRESS NOTES
PROGRESS NOTE     CARDIOLOGY    Chief complaint: Seen today for follow up, management & recommendations for elevated troponin    He denies chest pain or shortness of breath today. He was sitting on the side of bed. He was comfortable and in no distress. Wt Readings from Last 3 Encounters:   11/07/22 150 lb (68 kg)   02/05/17 150 lb (68 kg)   11/22/16 150 lb (68 kg)     Temp Readings from Last 3 Encounters:   11/09/22 97.4 °F (36.3 °C) (Temporal)   02/06/17 97.9 °F (36.6 °C) (Oral)   11/22/16 97.2 °F (36.2 °C) (Temporal)     BP Readings from Last 3 Encounters:   11/09/22 125/62   02/06/17 128/69   11/22/16 101/74     Pulse Readings from Last 3 Encounters:   11/09/22 78   02/06/17 84   11/22/16 88         Intake/Output Summary (Last 24 hours) at 11/9/2022 1316  Last data filed at 11/9/2022 1037  Gross per 24 hour   Intake 1367.72 ml   Output 450 ml   Net 917.72 ml       Recent Labs     11/07/22  1239 11/08/22  0611 11/09/22  0555   WBC 22.2* 14.8* 9.9   HGB 14.8 12.5 13.7   HCT 43.8 36.7* 39.1   MCV 94.6 92.7 89.7    315 338     Recent Labs     11/07/22  1005 11/07/22  1239 11/08/22  0611 11/09/22  0555     --  139 137   K 4.6  --  4.2 4.1     --  105 103   CO2 20*  --  25 26   BUN 24*  --  15 14   CREATININE 1.2  --  1.1 1.1   MG  --  2.3  --   --      No results for input(s): PROTIME, INR in the last 72 hours. Recent Labs     11/07/22  1005   CKTOTAL 189     No results for input(s): BNP in the last 72 hours. No results for input(s): CHOL, HDL, TRIG in the last 72 hours.     Invalid input(s): CHOLHDLR, LDLCALCU  Recent Labs     11/07/22  1239 11/07/22  2153 11/08/22  0611   TROPHS 121* 79* 80*         acetaminophen (TYLENOL) tablet 650 mg, Q4H PRN  lactated ringers bolus, Once  perflutren lipid microspheres (DEFINITY) injection 1.5 mL, ONCE PRN  sodium chloride flush 0.9 % injection 10 mL, 2 times per day  sodium chloride flush 0.9 % injection 10 mL, PRN  0.9 % sodium chloride infusion, PRN  enoxaparin (LOVENOX) injection 40 mg, Daily  promethazine (PHENERGAN) tablet 12.5 mg, Q6H PRN   Or  ondansetron (ZOFRAN) injection 4 mg, Q6H PRN  polyethylene glycol (GLYCOLAX) packet 17 g, Daily PRN  acetaminophen (TYLENOL) suppository 650 mg, Q6H PRN  ipratropium-albuterol (DUONEB) nebulizer solution 1 ampule, Q4H WA  glucose chewable tablet 16 g, PRN  dextrose bolus 10% 125 mL, PRN   Or  dextrose bolus 10% 250 mL, PRN  glucagon (rDNA) injection 1 mg, PRN  dextrose 10 % infusion, Continuous PRN  thiamine tablet 100 mg, Daily        Review of systems:     Heart: as above   Lungs: as above   Eyes: denies changes in vision or discharge. Ears: denies changes in hearing or pain. Nose: denies epistaxis or masses   Throat: denies sore throat or trouble swallowing. Neuro: denies numbness, tingling, tremors. Skin: denies rashes or itching. : denies hematuria, dysuria   GI: denies vomiting, diarrhea   Psych: denies mood changed, anxiety, depression. Physical exam:    Constitutional: A&O x3, communicates well, no acute distress. Eyes: extraocular muscles intact, PERRL. Normal lids & conjunctiva. No icterus. ENT: clear, no bleeding. No external masses. Lips normal formation. Neck: supple, full ROM, no JVD, no bruits, no lymphadenopathy. No masses. trachea midline. Heart: regular rate & rhythm, normal S1 & S2, no abnormal murmurs. No heave. Lungs: CTA. No accessory muscles. Poor air movement. Abd: soft, non-tender. Normal bowel sounds. Neuro: Full ROM X 4, EOMI, no tremors. EXT: No bilateral lower extremity edema  Skin: warm, dry, intact. Good turgor. Psych: A&O x 3, normal behavior, not anxious. Assessment/Recommendations  Syncope  ECG suggesting Brugada syndrome. EP for further management and recommendations regarding the abnormal ECG and syncope. Elevated troponin. Negative cardiac catheterization. Falsely elevated due to comorbidities.   Lactic acidosis  Back pain  Polysubstance abuse/use  Hypertension, improved  Preliminary echo shows normal LV function with no valvular abnormalities. Syncope, abnormal ECG per EP. No active cardiac issues. Cardiology will sign off. Note: This report was completed using computerized voice recognition software. Every effort has been made to ensure accuracy, however; and invert and computerized transcription errors may be present.

## 2022-11-09 NOTE — DISCHARGE INSTRUCTIONS
Internal medicine    Follow ups  Please follow up with your primary care physician ( [email or web address]) within 10 days of discharge from hospital. Please call as soon as possible to make an appointment. Please contact the internal medicine clinic for an appointment if you are unable to get an appointment with your PCP. Please keep all other follow up appointments:  None    Changes in healthcare   Please take all medications as indicated above  Diet: regular diet   Activity: activity as tolerated  Additional labs, testing or imaging needed after discharge   Lipid panel  New medication prescribed after this hospitalization are  Aspirin 81 mg daily  Atorvastatin 20 mg daily   Please refer to your Med list for details   Please contact us if you have any concerns, wish to change or make an appointment:  Internal medicine clinic   Phone: [phone]  Fax: [phone]  [facility] [address]  Or please call the nurse line at [phone]. Should you have further questions in regards to this visit, you can review your clinical note and after visit summary document on your [employer] account. Other questions can be directed to our nurse line at [phone]. Other than any new prescriptions given to you today, the list of home medications on this After Visit Summary are based on information provided to us from you and your healthcare providers. This information, including the list, dose, and frequency of medications is only as accurate as the information you provided. If you have any questions or concerns about your home medications, please contact your Primary Care Physician for further clarification.

## 2022-11-09 NOTE — DISCHARGE SUMMARY
[address]  Discharge Summary    PCP: Raul Orr DO    Admit Date:11/7/2022  Discharge Date: 11/9/2022    Chief Complaint   Patient presents with    Alcohol Intoxication     Found outside by family pt admits to drinking stating he was sitting on front porch         Admission Diagnosis:   Altered mental status 2/2 arrhythmia versus seizure versus drug abuse versus syncope versus infectious etiology versus cardiopulmonary collapse  Abnormal EKG 2/2 questionable arrhythmia versus NSTEMI  HAGMA 2/2 lactic acidosis  Elevated troponin secondary to demand ischemia   Hypercalcemia  Leukocytosis secondary reactive  Tobacco abuse  Hypercalcemia with elevated beta hydroxybutyrate    Discharge Diagnosis:  Altered mental status 2/2 +UDS ( + for cocaine, fentanyl, cannabinoids)  - resolved  Abnormal EKG 2/2 NSTEMI Type 4 - stable  HAGMA 2/2 lactic acidosis - resolved  Elevated troponin secondary to demand ischemia in setting of drug abuse- resolved  Hypercalcemia - resolved  Leukocytosis secondary reactive - resolved  Tobacco abuse - chronic  Hypercalcemia with elevated beta hydroxybutyrate - resolved    Hospital Course:     Patient is a 54-year-old male with past medical history of chronic back pain and herniated disc who presented to the ED after being found unresponsive prior to arrival.  Patient was found down by his mother and patient was unable to recall any of the events prior to arrival and awakening at the ED. patient states he was out with friends and did have a couple drinks and admitted to alcohol use as well as cocaine use a couple days prior. Denies any other illicit drug use. Work-up in the ED revealed a lactic acid of 11.1, calcium of 10.3, procalcitonin of 0.78, and a UDS positive for cocaine, cannabinoid, and fentanyl. Leukocytosis of 22.2.   Initial EKG had poor quality, but patient was tachycardic and there was questions of possible Brugada syndrome vs NSTEMI. Physical exam revealed bilateral crackles and patient was started on Merrem for prophylactic coverage. CT head was unremarkable as well as chest x-ray. Patient was admitted for further care management. Cardiology and electrophysiology were consulted. Left heart cath was performed on 11/08/2022 revealing normal LV size and function, EF 60%, and no significant stenosis of the arteries. Per EP, patient's altered mental status was likely due to acute intoxication from cocaine, fentanyl, and cannabinoids that were present on patient's UDS. Abnormal EKG suggestive of Brugada pattern most likely transiently induced by cocaine use. No further work-up or intervention was needed during this admission. Patient was counseled and educated on avoidance of polysubstance use. [facility].  Patient was started on low-dose aspirin and atorvastatin for primary prevention. Follow-up lipid panel at hospital follow-up visit. On day of discharge :   Subjective:    Patient seen and examined at bedside, comfortable and in no acute distress. Discussed results of left heart cath. Discussed the use of aspirin and statin upon discharge for primary prevention in the importance of following up with the [place] regarding lipid panel that is to be done outpatient. Patient agreeable to plans with further questions at that time. Significant findings (history and exam, laboratory, radiological, pathology, other tests):   General Appearance: alert, appears stated age, cooperative, and no distress  HEENT:  Head: Normocephalic, no lesions, without obvious abnormality.   Neck: no adenopathy and no JVD  Lung: clear to auscultation bilaterally  Heart: regular rate and rhythm, S1, S2 normal, no murmur, click, rub or gallop  Abdomen: soft, non-tender; bowel sounds normal; no masses,  no organomegaly  Extremities:  extremities normal, atraumatic, no cyanosis or edema  Musculokeletal: No joint swelling, no muscle tenderness. ROM normal in all joints of extremities. Neurologic: Mental status: Alert, oriented, thought content appropriate      Pending test results: None    Consults:  Cardiology  Electrophysiology    Procedures:  Left heart Cath    Condition at discharge: Stable    Disposition: home    Time taken for discharge : < 30 mins [] >30 mins [x]    Discharge Medications:     Medication List        START taking these medications      aspirin EC 81 MG EC tablet  Take 1 tablet by mouth daily     atorvastatin 20 MG tablet  Commonly known as: LIPITOR  Take 1 tablet by mouth daily               Where to Get Your Medications        These medications were sent to [facility], [address] [address]      Phone: [phone]   aspirin EC 81 MG EC tablet  atorvastatin 20 MG tablet         Patient Instructions:     Internal medicine    Follow ups  Please follow up with your primary care physician ( [email or web address]) within 10 days of discharge from hospital. Please call as soon as possible to make an appointment. Please contact the internal medicine clinic for an appointment if you are unable to get an appointment with your PCP.    Please keep all other follow up appointments:  None    Changes in healthcare   Please take all medications as indicated above  Diet: regular diet   Activity: activity as tolerated  Additional labs, testing or imaging needed after discharge   Lipid panel  New medication prescribed after this hospitalization are   Aspirin 81 mg daily   Atorvastatin 20 mg daily  Please refer to your Med list for details   Please contact us if you have any concerns, wish to change or make an appointment:  Internal medicine clinic   Phone: [phone]  Fax: [phone]  [address]  Or please call the nurse line at [phone]. Should you have further questions in regards to this visit, you can review your clinical note and after visit summary document on your [employer] account. Other questions can be directed to our nurse line at [phone]. Other than any new prescriptions given to you today, the list of home medications on this After Visit Summary are based on information provided to us from you and your healthcare providers. This information, including the list, dose, and frequency of medications is only as accurate as the information you provided. If you have any questions or concerns about your home medications, please contact your Primary Care Physician for further clarification.       Matt Rico, DO  PGY 1   7:14 AM 11/9/2022

## 2022-11-09 NOTE — CARE COORDINATION
[facility] Support Note    Name: Eduardo Fagan  Date: 11/9/2022    Chief Complaint   Patient presents with    Alcohol Intoxication     Found outside by family pt admits to drinking stating he was sitting on front porch       Peer Support met with patient. [] Support and education provided  [] Resources provided   [] Treatment referral:   [] Other:   [x] Patient declined peer recovery services     Referred By: Felice Hicks.  Peer supporter    Notes: Patient decline rehab and peer support    Signed: Angelo Anthony, 11/9/2022

## 2022-11-09 NOTE — PLAN OF CARE
Problem: Discharge Planning  Goal: Discharge to home or other facility with appropriate resources  Outcome: Progressing     Problem: Pain  Goal: Verbalizes/displays adequate comfort level or baseline comfort level  Outcome: Progressing     Problem: Nutrition Deficit:  Goal: Optimize nutritional status  Outcome: Progressing

## 2022-11-09 NOTE — PROGRESS NOTES
Progress  Note  Chief Complaint   Patient presents with    Alcohol Intoxication     Found outside by family pt admits to drinking stating he was sitting on front porch     Historical Issues:  Current Facility-Administered Medications   Medication Dose Route Frequency Provider Last Rate Last Admin    acetaminophen (TYLENOL) tablet 650 mg  650 mg Oral Q4H PRN Dain Lovett DO        lactated ringers bolus  30 mL/kg IntraVENous Once Clarisadre Tay MD        perflutren lipid microspheres (DEFINITY) injection 1.5 mL  1.5 mL IntraVENous ONCE PRN Clarisa Tay MD        sodium chloride flush 0.9 % injection 10 mL  10 mL IntraVENous 2 times per day Clarisa Tay MD   10 mL at 11/09/22 0810    sodium chloride flush 0.9 % injection 10 mL  10 mL IntraVENous PRN Clarisa Tay MD        0.9 % sodium chloride infusion   IntraVENous PRN Clarisa Tay MD        enoxaparin (LOVENOX) injection 40 mg  40 mg SubCUTAneous Daily Clarisa Tay MD   40 mg at 11/07/22 1851    promethazine (PHENERGAN) tablet 12.5 mg  12.5 mg Oral Q6H PRN Clarisa Tay MD        Or    ondansetron [facility]) injection 4 mg  4 mg IntraVENous Q6H PRN Clarisa Tay MD        polyethylene glycol (GLYCOLAX) packet 17 g  17 g Oral Daily PRN Clarisa Tay MD        acetaminophen (TYLENOL) suppository 650 mg  650 mg Rectal Q6H PRN Clarisa Tay MD        ipratropium-albuterol (DUONEB) nebulizer solution 1 ampule  1 ampule Inhalation Q4H WA Clarisa Tay MD        glucose chewable tablet 16 g  4 tablet Oral PRN Clarisa Tay MD        dextrose bolus 10% 125 mL  125 mL IntraVENous PRN Clarisa Tay MD        Or    dextrose bolus 10% 250 mL  250 mL IntraVENous PRN Clarisa Tay MD        glucagon (rDNA) injection 1 mg  1 mg SubCUTAneous PRN Clarisa Tay MD        dextrose 10 % infusion   IntraVENous Continuous PRN Clarisa Tay MD        thiamine tablet 100 mg  100 mg Oral Daily Clarisa Tay MD   100 mg at 11/09/22 0809     Recent Complaints:  Review of Systems  Vitals:    11/09/22 0755   BP: 125/62   Pulse: 78 Resp: 18   Temp: 97.4 °F (36.3 °C)   SpO2: 99%     Physical Exam    Recent Labs     11/07/22  1005 11/08/22  0611 11/09/22  0555    139 137   K 4.6 4.2 4.1    105 103   CO2 20* 25 26   BUN 24* 15 14   CREATININE 1.2 1.1 1.1   GLUCOSE 199* 88 81   CALCIUM 10.3* 8.8 9.1     Recent Labs     11/07/22  1239 11/08/22  0611 11/09/22  0555   WBC 22.2* 14.8* 9.9   RBC 4.63 3.96 4.36   HGB 14.8 12.5 13.7   HCT 43.8 36.7* 39.1   MCV 94.6 92.7 89.7   MCH 32.0 31.6 31.4   MCHC 33.8 34.1 35.0*   RDW 15.0 14.9 14.4    315 338   MPV 9.1 9.7 9.2           Principal Problem:    NSTEMI (non-ST elevated myocardial infarction) ([facility]) Type 4  Active Problems:    Syncope and collapse    Brugada syndrome    Cocaine use    Altered mental status    Lactic acidosis  Resolved Problems:    * No resolved hospital problems. *      Plan:  Plan discharge today  Aspirin and statin  Avoid beta-blockade due to cocaine use  Several long discussions with regards to his illicit drug use of been made.   We will be giving him a list of resources that he can use once he leaves the hospital.    Electronically signed by Dennis Terrazas MD on 11/9/2022 at 9:48 AM

## 2022-11-09 NOTE — PROGRESS NOTES
(non-ST elevated myocardial infarction) ([address] 11/07/2022     Priority: Medium     Assessment & Plan Note:      Likely thought to be due to cocaine use, cardiac cath did not show significant blockage      Acute renal failure (ARF) ([address] 02/06/2017    Herniated nucleus pulposus, L4-5 06/06/2016       Past Medical History:   Diagnosis Date    Chronic back pain     Herniated disc        Family History   Problem Relation Age of Onset    Heart Disease Mother     Thyroid Disease Mother     High Blood Pressure Mother     High Blood Pressure Father     Diabetes Father        Social History     Tobacco Use    Smoking status: Every Day     Packs/day: 0.25     Types: Cigars, Cigarettes    Smokeless tobacco: Never    Tobacco comments:     2 Cigars per day   Substance Use Topics    Alcohol use:  Yes     Alcohol/week: 2.0 standard drinks     Types: 2 Cans of beer per week       Current Facility-Administered Medications   Medication Dose Route Frequency Provider Last Rate Last Admin    acetaminophen (TYLENOL) tablet 650 mg  650 mg Oral Q4H PRN Verlin Rice, DO        lactated ringers bolus  30 mL/kg IntraVENous Once Milton Leggett MD        perflutren lipid microspheres (DEFINITY) injection 1.5 mL  1.5 mL IntraVENous ONCE PRN Milton Leggett MD        sodium chloride flush 0.9 % injection 10 mL  10 mL IntraVENous 2 times per day Milton Leggett MD   10 mL at 11/09/22 0810    sodium chloride flush 0.9 % injection 10 mL  10 mL IntraVENous PRN Milton Leggett MD        0.9 % sodium chloride infusion   IntraVENous PRN Milton Leggett MD        enoxaparin (LOVENOX) injection 40 mg  40 mg SubCUTAneous Daily Milton Leggett MD   40 mg at 11/07/22 1851    promethazine (PHENERGAN) tablet 12.5 mg  12.5 mg Oral Q6H PRN Milton Leggett MD        Or    ondansetron [facility]) injection 4 mg  4 mg IntraVENous Q6H PRN Milton Leggett MD        polyethylene glycol (GLYCOLAX) packet 17 g  17 g Oral Daily PRN Milton Leggett MD        acetaminophen (TYLENOL) suppository 650 mg  650 mg Rectal Q6H PRN Dominga Lugo MD        ipratropium-albuterol (DUONEB) nebulizer solution 1 ampule  1 ampule Inhalation Q4H WA Dominga Lugo MD        glucose chewable tablet 16 g  4 tablet Oral PRN Dominga Lugo MD        dextrose bolus 10% 125 mL  125 mL IntraVENous PRN Dominga Lugo MD        Or    dextrose bolus 10% 250 mL  250 mL IntraVENous PRN Dominga Lugo MD        glucagon (rDNA) injection 1 mg  1 mg SubCUTAneous PRN Dominga Lugo MD        dextrose 10 % infusion   IntraVENous Continuous PRN Dominga Lugo MD        thiamine tablet 100 mg  100 mg Oral Daily Dominga Lugo MD   100 mg at 11/09/22 0809        Allergies   Allergen Reactions    Penicillins      Unknown child       ROS:   Constitutional: Negative for fever, activity change and appetite change. HENT: Negative for epistaxis. Eyes: Negative for diploplia, blurred vision. Respiratory: Negative for cough, chest tightness, shortness of breath and wheezing. Cardiovascular: pertinent positives in HPI  Gastrointestinal: Negative for abdominal pain and blood in stool. All other review of systems are negative     PHYSICAL EXAM:   Vitals:    11/08/22 2006 11/09/22 0000 11/09/22 0324 11/09/22 0755   BP: 110/61 (!) 103/59 122/67 125/62   Pulse: 79 71 71 78   Resp: 18 16 18 18   Temp: 97.2 °F (36.2 °C) 97.3 °F (36.3 °C) 97.1 °F (36.2 °C) 97.4 °F (36.3 °C)   TempSrc: Temporal Temporal Temporal Temporal   SpO2: 96% 96% 98% 99%   Weight:       Height:          Constitutional: Well-developed, no acute distress  Eyes: conjunctivae normal, no xanthelasma   Ears, Nose, Throat: oral mucosa moist, no cyanosis   CV: no JVD. Regular rate and rhythm. Normal S1S2 and no S3. No murmurs, rubs, or gallops.  PMI is nondisplaced  Lungs: clear to auscultation bilaterally, normal respiratory effort without used of accessory muscles  Abdomen: soft, non-tender, bowel sounds present, no masses or hepatomegaly   Musculoskeletal: no digital clubbing, no edema   Skin: warm, no rashes     I have personally reviewed the laboratory, cardiac diagnostic and radiographic testing as outlined below:    Data:    Recent Labs     11/07/22  1239 11/08/22  0611 11/09/22  0555   WBC 22.2* 14.8* 9.9   HGB 14.8 12.5 13.7   HCT 43.8 36.7* 39.1    315 338     Recent Labs     11/07/22  1005 11/08/22  0611 11/09/22  0555    139 137   K 4.6 4.2 4.1    105 103   CO2 20* 25 26   BUN 24* 15 14   CREATININE 1.2 1.1 1.1   CALCIUM 10.3* 8.8 9.1      Lab Results   Component Value Date/Time    MG 2.3 11/07/2022 12:39 PM     Recent Labs     11/07/22  1239   TSH 0.428     No results for input(s): INR in the last 72 hours. CXR: 11/7/22  Impression   No evidence of acute cardiopulmonary disease is seen. Telemetry: Sinus rhythm    EKG: Sinus rhythm, ST elevation in V1 V2 suggestive of Brugada pattern  Early repolarization also noted in inferior and lateral leads    Echocardiogram: Complete but not read    Cardiac Catheterization: 11/8/22  Hemodynamics:  Opening Aortic pressure: 462/16  LV systolic pressure: 221  LVEDP: 16  No significant gradient across the aortic valve     Angiographic Results/findings:  Left Main: Short. No angiographically significant stenosis. LAD: No angiographically significant stenosis. D1: No angiographically significant stenosis. D2: Small vessel. No angiographically significant stenosis. Cx: Codominant vessel. No angiographically significant stenosis. OM1: Large vessel. No angiographically significant stenosis. OM2: No angiographically significant stenosis. OM 3: Large vessel. No angiographically significant stenosis. Ramus: Absent  RCA: Takeoff high over the left coronary cusp. Co-dominant. No angiographically significant stenosis. PDA: No angiographically significant stenosis. LV: Normal LV size and systolic function. No wall motion abnormalities. Ejection fraction 60%      I have independently reviewed all of the ECGs and rhythm strips per above     Assessment/Plan:  This is a 47 y.o. male with a history of     1. Syncope--patient's initial work-up includes a tox screen positive for cocaine fentanyl and cannabinoids as well as high alcohol level. Syncope was therefore most likely related to acute intoxication    2. Abnormal EKG on presentation with ST elevation suggestive of a Brugada pattern. The ECG findings of Brugada pattern can be transiently induced by cocaine use . Cocaine acts like a class I antiarrhythmic agent, producing local anesthetic effects via sodium channel blockade in the heart which could explain the Brugada pattern. Patient history is not significant for \"Brugada syndrome\" with no documented ventricular arrhythmias noted, no family history of sudden death.  -Less prominent V1, V2 ST changes on EKG today. 3.  Polysubstance abuse--chronic as mentioned above    4. Chronic back pain    Recommendations:    No specific work-up or intervention needed from electrophysiology standpoint   Patient counseled regarding avoiding cocaine, cannabinoids or fentanyl use. 3.  Okay to discharge from an electrophysiology perspective. I have spent a total of 10 minutes with the patient reviewing the above stated recommendations. And a total of >50% of that time involved face-to-face time providing counseling and or coordination of care with the other providers, reviewing records/tests, counseling/education of the patient, ordering medications/tests/procedures, coordinating care, and documenting clinical information in the EHR. Thank you for allowing me to participate in your patient's care. Please call me if there are any questions or concerns. Discussed with Dr. Florence Franklin. Cayden Jimenez, EDITH - CNP  Cardiac Electrophysiology  [facility]) Physicians  [facility] [place]: [place] [facility]  12:41 PM  11/9/2022    PHYSICIAN ADDENDUM:  I independently contributed to, made amendments as needed, and finalized the above documentation.  I contributed >50% to the overall encounter time including review of testing, formulating a plan with the APC and communication with the other care team members and family. I have spent a total of 30-35 minutes with the patient reviewing the above stated recommendations. And a total of >50% of that time involved face-to-face time providing counseling and or coordination of care with the other providers, reviewing records/tests, counseling/education of the patient, ordering medications/tests/procedures, coordinating care, and documenting clinical information in the EHR.      Taisha Amaya  Cardiac Electrophysiology  [facility]) Physicians

## 2022-11-09 NOTE — TELEPHONE ENCOUNTER
Jerod Comment, DO  Annamaria Lopez  EP for abnormal ECG and syncope. Can follow with cardiology. Mihaela Scott

## 2022-11-09 NOTE — PATIENT CARE CONFERENCE
P Quality Flow/Interdisciplinary Rounds Progress Note        Quality Flow Rounds held on November 9, 2022    Disciplines Attending:  Bedside Nurse, , , and Nursing Unit Leadership    Windy Garza was admitted on 11/7/2022 10:03 AM    Anticipated Discharge Date:  Expected Discharge Date: 11/09/22    Disposition:    Pierce Score:  Pierce Scale Score: 21    Readmission Risk              Risk of Unplanned Readmission:  12           Discussed patient goal for the day, patient clinical progression, and barriers to discharge.   The following Goal(s) of the Day/Commitment(s) have been identified:  discharge plan      Joaquín Varma RN  November 9, 2022

## 2022-11-09 NOTE — CARE COORDINATION
Pt discharging home today, called Peer recovery to notify of discharge, left a message. Nancy Barriga, MSW, LSW

## 2022-11-09 NOTE — PROGRESS NOTES
CLINICAL PHARMACY NOTE: MEDS TO BEDS    Total # of Prescriptions Filled: 2   The following medications were delivered to the patient:  Aspirin 81mg  Atorvastatin 20mg    Additional Documentation:

## 2022-11-09 NOTE — CONSULTS
Family History   Problem Relation Age of Onset    Heart Disease Mother     Thyroid Disease Mother     High Blood Pressure Mother     High Blood Pressure Father     Diabetes Father        Social History     Tobacco Use    Smoking status: Every Day     Packs/day: 0.25     Types: Cigars, Cigarettes    Smokeless tobacco: Never    Tobacco comments:     2 Cigars per day   Substance Use Topics    Alcohol use:  Yes     Alcohol/week: 2.0 standard drinks     Types: 2 Cans of beer per week       Current Facility-Administered Medications   Medication Dose Route Frequency Provider Last Rate Last Admin    acetaminophen (TYLENOL) tablet 650 mg  650 mg Oral Q4H PRN Anu Lamar DO        lactated ringers bolus  30 mL/kg IntraVENous Once Edyta Diaz MD        perflutren lipid microspheres (DEFINITY) injection 1.5 mL  1.5 mL IntraVENous ONCE PRN Edyta Diaz MD        sodium chloride flush 0.9 % injection 10 mL  10 mL IntraVENous 2 times per day Edyta Diaz MD   10 mL at 11/08/22 0923    sodium chloride flush 0.9 % injection 10 mL  10 mL IntraVENous PRN Edyta Diaz MD        0.9 % sodium chloride infusion   IntraVENous PRN Edyta Diaz MD        enoxaparin (LOVENOX) injection 40 mg  40 mg SubCUTAneous Daily Edyta Diaz MD   40 mg at 11/07/22 1851    promethazine (PHENERGAN) tablet 12.5 mg  12.5 mg Oral Q6H PRN Edyta Diaz MD        Or    ondansetron [facility]) injection 4 mg  4 mg IntraVENous Q6H PRN Edyta Diaz MD        polyethylene glycol (GLYCOLAX) packet 17 g  17 g Oral Daily PRN Edyta Diaz MD        acetaminophen (TYLENOL) suppository 650 mg  650 mg Rectal Q6H PRN Edyta Diaz MD        ipratropium-albuterol (DUONEB) nebulizer solution 1 ampule  1 ampule Inhalation Q4H WA Edyta Diaz MD        glucose chewable tablet 16 g  4 tablet Oral PRN Edyta Diaz MD        dextrose bolus 10% 125 mL  125 mL IntraVENous PRN Edyta Diaz MD        Or    dextrose bolus 10% 250 mL  250 mL IntraVENous PRN Edyta Diaz MD        glucagon (rDNA) injection 1 mg  1 mg SubCUTAneous PRN Taylor Hairston MD        dextrose 10 % infusion   IntraVENous Continuous PRN Taylor Hairston MD        thiamine tablet 100 mg  100 mg Oral Daily Taylor Hairston MD   100 mg at 11/08/22 0921    meropenem (MERREM) 1,000 mg in sodium chloride 0.9 % 100 mL IVPB (mini-bag)  1,000 mg IntraVENous Q8H Taylor Hairston MD   Stopped at 11/08/22 1634        Allergies   Allergen Reactions    Penicillins      Unknown child       ROS:   Constitutional: Negative for fever, activity change and appetite change. HENT: Negative for epistaxis. Eyes: Negative for diploplia, blurred vision. Respiratory: Negative for cough, chest tightness, shortness of breath and wheezing. Cardiovascular: pertinent positives in HPI  Gastrointestinal: Negative for abdominal pain and blood in stool. All other review of systems are negative     PHYSICAL EXAM:   Vitals:    11/08/22 1615 11/08/22 1645 11/08/22 1745 11/08/22 2006   BP: 130/68 128/68 116/71 110/61   Pulse: 69 81  79   Resp: 17 17 17 18   Temp:  98.4 °F (36.9 °C)  97.2 °F (36.2 °C)   TempSrc:  Oral  Temporal   SpO2:  98% 98% 96%   Weight:       Height:          Constitutional: Well-developed, no acute distress  Eyes: conjunctivae normal, no xanthelasma   Ears, Nose, Throat: oral mucosa moist, no cyanosis   CV: no JVD. Regular rate and rhythm. Normal S1S2 and no S3. No murmurs, rubs, or gallops.  PMI is nondisplaced  Lungs: clear to auscultation bilaterally, normal respiratory effort without used of accessory muscles  Abdomen: soft, non-tender, bowel sounds present, no masses or hepatomegaly   Musculoskeletal: no digital clubbing, no edema   Skin: warm, no rashes     I have personally reviewed the laboratory, cardiac diagnostic and radiographic testing as outlined below:    Data:    Recent Labs     11/07/22  1005 11/07/22  1239 11/08/22  0611   WBC 25.5* 22.2* 14.8*   HGB 15.9 14.8 12.5   HCT 48.8 43.8 36.7*    379 315     Recent Labs     11/07/22  1005 11/08/22  0611   NA 145 139   K 4.6 4.2    105   CO2 20* 25   BUN 24* 15   CREATININE 1.2 1.1   CALCIUM 10.3* 8.8      Lab Results   Component Value Date/Time    MG 2.3 11/07/2022 12:39 PM     Recent Labs     11/07/22  1239   TSH 0.428     No results for input(s): INR in the last 72 hours. CXR: 11/7/22  Impression   No evidence of acute cardiopulmonary disease is seen. Telemetry: Sinus rhythm    EKG: Sinus rhythm, ST elevation in V1 V2 suggestive of Brugada pattern  Early repolarization also noted in inferior and lateral leads    Echocardiogram: Pending    Cardiac Catheterization: 11/8/22  Hemodynamics:  Opening Aortic pressure: 310/75  LV systolic pressure: 957  LVEDP: 16  No significant gradient across the aortic valve     Angiographic Results/findings:  Left Main: Short. No angiographically significant stenosis. LAD: No angiographically significant stenosis. D1: No angiographically significant stenosis. D2: Small vessel. No angiographically significant stenosis. Cx: Codominant vessel. No angiographically significant stenosis. OM1: Large vessel. No angiographically significant stenosis. OM2: No angiographically significant stenosis. OM 3: Large vessel. No angiographically significant stenosis. Ramus: Absent  RCA: Takeoff high over the left coronary cusp. Co-dominant. No angiographically significant stenosis. PDA: No angiographically significant stenosis. LV: Normal LV size and systolic function. No wall motion abnormalities. Ejection fraction 60%      I have independently reviewed all of the ECGs and rhythm strips per above     Assessment/Plan: This is a 47 y.o. male with a history of     1. Syncope--patient's initial work-up includes a tox screen positive for cocaine fentanyl and cannabinoids as well as high alcohol level. Syncope was therefore most likely related to acute intoxication    2. Abnormal EKG on presentation with ST elevation suggestive of a Brugada pattern. The ECG findings of Brugada pattern can be transiently induced by cocaine use  Cocaine acts like a class I antiarrhythmic agent, producing local anesthetic effects via sodium channel blockade in the heart could explain the Brugada pattern. Patient history is not significant for \"Brugada syndrome\" with no documented ventricular arrhythmias noted, no family history of sudden death. 3.  Polysubstance abuse--chronic as mentioned above    4. Chronic back pain    Recommendations:    No specific work-up or intervention needed from electrophysiology standpoint   Patient counseled regarding avoiding cocaine, cannabinoids or fentanyl use. I have spent a total of 60-65 minutes with the patient reviewing the above stated recommendations. And a total of >50% of that time involved face-to-face time providing counseling and or coordination of care with the other providers, reviewing records/tests, counseling/education of the patient, ordering medications/tests/procedures, coordinating care, and documenting clinical information in the EHR. Thank you for allowing me to participate in your patient's care. Please call me if there are any questions or concerns.       Mando Arenas MD  Cardiac Electrophysiology  [facility]) Phys[facility]  [facility]  9:00 PM  11/8/2022

## 2022-11-10 LAB — URINE CULTURE, ROUTINE: NORMAL

## 2022-11-11 LAB
EKG ATRIAL RATE: 127 BPM
EKG ATRIAL RATE: 64 BPM
EKG ATRIAL RATE: 72 BPM
EKG P AXIS: 65 DEGREES
EKG P AXIS: 78 DEGREES
EKG P-R INTERVAL: 126 MS
EKG P-R INTERVAL: 140 MS
EKG Q-T INTERVAL: 352 MS
EKG Q-T INTERVAL: 390 MS
EKG Q-T INTERVAL: 418 MS
EKG QRS DURATION: 78 MS
EKG QRS DURATION: 78 MS
EKG QRS DURATION: 82 MS
EKG QTC CALCULATION (BAZETT): 427 MS
EKG QTC CALCULATION (BAZETT): 431 MS
EKG QTC CALCULATION (BAZETT): 511 MS
EKG R AXIS: 51 DEGREES
EKG R AXIS: 66 DEGREES
EKG R AXIS: 76 DEGREES
EKG T AXIS: 49 DEGREES
EKG T AXIS: 55 DEGREES
EKG T AXIS: 55 DEGREES
EKG VENTRICULAR RATE: 127 BPM
EKG VENTRICULAR RATE: 64 BPM
EKG VENTRICULAR RATE: 72 BPM

## 2022-11-11 PROCEDURE — 93010 ELECTROCARDIOGRAM REPORT: CPT | Performed by: INTERNAL MEDICINE

## 2022-11-12 LAB
BLOOD CULTURE, ROUTINE: NORMAL
CULTURE, BLOOD 2: NORMAL

## 2022-11-22 NOTE — PROGRESS NOTES
Physician Progress Note      PATIENT:               Oscar Palacio  [facility] #:                  [id number]  :                       1970  ADMIT DATE:       2022 10:03 AM  DISCH DATE:        2022 1:43 PM  RESPONDING  PROVIDER #:        Jacob Arredondo MD          QUERY TEXT:    Patient admitted with AMS. Noted to have \"NSTEMI\" in Cardiology consult note   on  and \"Cardiac catheterization\" on . If possible, please document in   the progress notes and discharge summary if you are evaluating and/or   treating any of the following: The medical record reflects the following:  Risk Factors: Cath, NSTEMI  Clinical Indicators: Per  H&P noted \"Elevated troponin 2/2 demand   ischemia\", Cardiac Catheterization-, lab reports noted \"High sensitivity   troponin T 80H-, 79H, 121H-\", per  progress note noted \"NSTEMI   (non-ST elevated myocardial infarction) ([address] Type 4\". Discharge summary noted   \"Abnormal EKG 2/2 NSTEMI Type 4\". Left heart Cath was performed on . Treatment: Cardiology consult, Cardiac Catheterization. Thank you,  Parag Bedolla RN BSN CCD[facility]  [phone]  Options provided:  -- NSTEMI type 4 associated with cardiac catheterization confirmed  -- NSTEMI type 4 not associated with cardiac catheterization  -- NSTEMI Type please specify type, please specify type  -- Other - I will add my own diagnosis  -- Disagree - Not applicable / Not valid  -- Disagree - Clinically unable to determine / Unknown  -- Refer to Clinical Documentation Reviewer    PROVIDER RESPONSE TEXT:    Type 2 NSTEMI was present. Disregard type 4 NSTEMI. Query created by:  Lashonda Hooker on 2022 4:02 PM      Electronically signed by:  Jacob Arredondo MD 2022 8:08 AM

## 2023-02-27 ENCOUNTER — HOSPITAL ENCOUNTER (EMERGENCY)
Age: 53
Discharge: HOME | End: 2023-02-27
Payer: MEDICAID

## 2023-02-27 VITALS
HEIGHT: 69 IN | DIASTOLIC BLOOD PRESSURE: 69 MMHG | HEART RATE: 97 BPM | SYSTOLIC BLOOD PRESSURE: 118 MMHG | TEMPERATURE: 97 F | WEIGHT: 145 LBS | RESPIRATION RATE: 16 BRPM | OXYGEN SATURATION: 98 % | BODY MASS INDEX: 21.48 KG/M2

## 2023-02-27 DIAGNOSIS — G89.29 ACUTE EXACERBATION OF CHRONIC LOW BACK PAIN: Primary | ICD-10-CM

## 2023-02-27 DIAGNOSIS — Z71.1 CONCERN ABOUT STD IN MALE WITHOUT DIAGNOSIS: ICD-10-CM

## 2023-02-27 DIAGNOSIS — M54.50 ACUTE EXACERBATION OF CHRONIC LOW BACK PAIN: Primary | ICD-10-CM

## 2023-02-27 LAB
BACTERIA: NORMAL /HPF
BILIRUBIN URINE: NEGATIVE
BLOOD, URINE: NEGATIVE
CLARITY: CLEAR
COLOR: YELLOW
GLUCOSE URINE: NEGATIVE MG/DL
KETONES, URINE: NEGATIVE MG/DL
LEUKOCYTE ESTERASE, URINE: ABNORMAL
NITRITE, URINE: NEGATIVE
PH UA: 6.5 (ref 5–9)
PROTEIN UA: NEGATIVE MG/DL
RBC UA: NORMAL /HPF (ref 0–2)
SPECIFIC GRAVITY UA: 1.02 (ref 1–1.03)
UROBILINOGEN, URINE: 1 E.U./DL
WBC UA: NORMAL /HPF (ref 0–5)

## 2023-02-27 PROCEDURE — 87491 CHLMYD TRACH DNA AMP PROBE: CPT

## 2023-02-27 PROCEDURE — 2500000003 HC RX 250 WO HCPCS: Performed by: NURSE PRACTITIONER

## 2023-02-27 PROCEDURE — 87591 N.GONORRHOEAE DNA AMP PROB: CPT

## 2023-02-27 PROCEDURE — 6360000002 HC RX W HCPCS: Performed by: NURSE PRACTITIONER

## 2023-02-27 PROCEDURE — 6370000000 HC RX 637 (ALT 250 FOR IP): Performed by: NURSE PRACTITIONER

## 2023-02-27 PROCEDURE — 81001 URINALYSIS AUTO W/SCOPE: CPT

## 2023-02-27 PROCEDURE — 96372 THER/PROPH/DIAG INJ SC/IM: CPT

## 2023-02-27 PROCEDURE — 99284 EMERGENCY DEPT VISIT MOD MDM: CPT

## 2023-02-27 RX ORDER — CYCLOBENZAPRINE HCL 10 MG
10 TABLET ORAL 3 TIMES DAILY PRN
Qty: 10 TABLET | Refills: 0 | Status: SHIPPED | OUTPATIENT
Start: 2023-02-27 | End: 2023-03-09

## 2023-02-27 RX ORDER — ONDANSETRON 4 MG/1
4 TABLET, ORALLY DISINTEGRATING ORAL ONCE
Status: COMPLETED | OUTPATIENT
Start: 2023-02-27 | End: 2023-02-27

## 2023-02-27 RX ORDER — NAPROXEN 250 MG/1
500 TABLET ORAL ONCE
Status: COMPLETED | OUTPATIENT
Start: 2023-02-27 | End: 2023-02-27

## 2023-02-27 RX ORDER — METRONIDAZOLE 500 MG/1
2000 TABLET ORAL ONCE
Status: COMPLETED | OUTPATIENT
Start: 2023-02-27 | End: 2023-02-27

## 2023-02-27 RX ORDER — NAPROXEN 500 MG/1
500 TABLET ORAL 2 TIMES DAILY PRN
Qty: 28 TABLET | Refills: 0 | Status: SHIPPED | OUTPATIENT
Start: 2023-02-27

## 2023-02-27 RX ORDER — AZITHROMYCIN 250 MG/1
1000 TABLET, FILM COATED ORAL ONCE
Status: COMPLETED | OUTPATIENT
Start: 2023-02-27 | End: 2023-02-27

## 2023-02-27 RX ADMIN — CEFTRIAXONE 500 MG: 1 INJECTION, POWDER, FOR SOLUTION INTRAMUSCULAR; INTRAVENOUS at 16:18

## 2023-02-27 RX ADMIN — AZITHROMYCIN 1000 MG: 250 TABLET, FILM COATED ORAL at 16:18

## 2023-02-27 RX ADMIN — ONDANSETRON 4 MG: 4 TABLET, ORALLY DISINTEGRATING ORAL at 16:17

## 2023-02-27 RX ADMIN — METRONIDAZOLE 2000 MG: 500 TABLET ORAL at 16:17

## 2023-02-27 RX ADMIN — NAPROXEN 500 MG: 250 TABLET ORAL at 16:17

## 2023-02-27 ASSESSMENT — PAIN DESCRIPTION - ORIENTATION: ORIENTATION: MID

## 2023-02-27 ASSESSMENT — PAIN DESCRIPTION - DESCRIPTORS: DESCRIPTORS: ACHING;THROBBING

## 2023-02-27 ASSESSMENT — PAIN DESCRIPTION - LOCATION: LOCATION: BACK

## 2023-02-27 ASSESSMENT — LIFESTYLE VARIABLES
HOW OFTEN DO YOU HAVE A DRINK CONTAINING ALCOHOL: NEVER
HOW MANY STANDARD DRINKS CONTAINING ALCOHOL DO YOU HAVE ON A TYPICAL DAY: PATIENT DOES NOT DRINK

## 2023-02-27 ASSESSMENT — PAIN SCALES - GENERAL: PAINLEVEL_OUTOF10: 7

## 2023-02-27 ASSESSMENT — PAIN - FUNCTIONAL ASSESSMENT: PAIN_FUNCTIONAL_ASSESSMENT: 0-10

## 2023-02-27 NOTE — Clinical Note
Tony Ortez was seen and treated in our emergency department on 2/27/2023. He may return to work on 02/28/2023. If you have any questions or concerns, please don't hesitate to call.       Elena Encinas, EDITH - CNP

## 2023-02-27 NOTE — ED PROVIDER NOTES
Independent DENISE Visit. HPI:  2/27/23,   Time: 4:49 PM JOSHUA Gudino is a 46 y.o. male presenting to the ED for lower back pain, beginning few days ago. The complaint has been persistent, mild in severity, and worsened by nothing. Presents for complaints of lower back pain which he states began a few days ago while having intercourse he states he got a little bit carried away. States it is a new sexual partner and he is concerned about an STD exposure. He denies any penile discharge or dysuria symptoms but states he is asking for a STD check and treatment. He denies any known fall or known injury. He does have chronic low back pain. ROS:   Pertinent positives and negatives are stated within HPI, all other systems reviewed and are negative.  --------------------------------------------- PAST HISTORY ---------------------------------------------  Past Medical History:  has a past medical history of Chronic back pain and Herniated disc. Past Surgical History:  has a past surgical history that includes hernia repair (1974); laminectomy (10/11/2011); and other surgical history (2/9/16). Social History:  reports that he has been smoking cigars and cigarettes. He has been smoking an average of .25 packs per day. He has never used smokeless tobacco. He reports current alcohol use of about 2.0 standard drinks per week. He reports that he does not use drugs. Family History: family history includes Diabetes in his father; Heart Disease in his mother; High Blood Pressure in his father and mother; Thyroid Disease in his mother. The patients home medications have been reviewed.     Allergies: Penicillins    -------------------------------------------------- RESULTS -------------------------------------------------  All laboratory and radiology results have been personally reviewed by myself   LABS:  Results for orders placed or performed during the hospital encounter of 02/27/23   C.trachomatis N.gonorrhoeae DNA, Urine    Specimen: Urine   Result Value Ref Range    Source Urine        RADIOLOGY:  Interpreted by Radiologist.  No orders to display       ------------------------- NURSING NOTES AND VITALS REVIEWED ---------------------------   The nursing notes within the ED encounter and vital signs as below have been reviewed. /69   Pulse 97   Temp 97 °F (36.1 °C) (Tympanic)   Resp 16   Ht 5' 9\" (1.753 m)   Wt 145 lb (65.8 kg)   SpO2 98%   BMI 21.41 kg/m²   Oxygen Saturation Interpretation: Normal      ---------------------------------------------------PHYSICAL EXAM--------------------------------------      Constitutional/General: Alert and oriented x3, well appearing, non toxic in NAD  Head: NC/AT  Eyes: PERRL, EOMI  Mouth: Oropharynx clear, handling secretions, no trismus  Neck: Supple, full ROM, no meningeal signs  Pulmonary: Lungs clear to auscultation bilaterally, no wheezes, rales, or rhonchi. Not in respiratory distress  Cardiovascular:  Regular rate and rhythm, no murmurs, gallops, or rubs. 2+ distal pulses  Abdomen: Soft, non tender, non distended,   Extremities: Moves all extremities x 4. Warm and well perfused, mild tenderness on palpation to the lower lumbar region. No midline tenderness. No radicular symptoms of the lower extremities. Negative straight leg raise bilaterally.   Skin: warm and dry without rash  Neurologic: GCS 15,  Psych: Normal Affect      ------------------------------ ED COURSE/MEDICAL DECISION MAKING----------------------  Medications   naproxen (NAPROSYN) tablet 500 mg (500 mg Oral Given 2/27/23 1617)   ondansetron (ZOFRAN-ODT) disintegrating tablet 4 mg (4 mg Oral Given 2/27/23 1617)   cefTRIAXone (ROCEPHIN) 500 mg in lidocaine 1 % 1.43 mL IM Injection (500 mg IntraMUSCular Given 2/27/23 1618)   azithromycin (ZITHROMAX) tablet 1,000 mg (1,000 mg Oral Given 2/27/23 1618)   metroNIDAZOLE (FLAGYL) tablet 2,000 mg (2,000 mg Oral Given 2/27/23 1617) Medical Decision Making:    Presents for complaints of lower back pain which he states began a few days ago while having intercourse he states he got a little bit carried away. States it is a new sexual partner and he is concerned about an STD exposure. He denies any penile discharge or dysuria symptoms but states he is asking for a STD check and treatment. He denies any known fall or known injury. He does have chronic low back pain. Prophylactic treatment provided for STD exposure as well as pain medication. Advised to follow-up with PCP counseled at length on safe sex practices including consistent condom use. Will be started on anti-inflammatory as well as muscle relaxants. Advised to follow-up with PCP if any further problems. Counseling: The emergency provider has spoken with the patient and discussed todays results, in addition to providing specific details for the plan of care and counseling regarding the diagnosis and prognosis. Questions are answered at this time and they are agreeable with the plan.      --------------------------------- IMPRESSION AND DISPOSITION ---------------------------------    IMPRESSION  1. Acute exacerbation of chronic low back pain    2.  Concern about STD in male without diagnosis        DISPOSITION  Disposition: Discharge to home  Patient condition is good                 EDITH Donis CNP  02/27/23 0272

## 2023-03-01 LAB
C. TRACHOMATIS DNA ,URINE: NEGATIVE
N. GONORRHOEAE DNA, URINE: ABNORMAL
SOURCE: ABNORMAL

## 2024-08-10 ENCOUNTER — HOSPITAL ENCOUNTER (EMERGENCY)
Age: 54
Discharge: HOME | End: 2024-08-10
Attending: STUDENT IN AN ORGANIZED HEALTH CARE EDUCATION/TRAINING PROGRAM
Payer: MEDICAID

## 2024-08-10 VITALS
DIASTOLIC BLOOD PRESSURE: 60 MMHG | SYSTOLIC BLOOD PRESSURE: 115 MMHG | WEIGHT: 137 LBS | OXYGEN SATURATION: 98 % | RESPIRATION RATE: 14 BRPM | BODY MASS INDEX: 20.23 KG/M2 | HEART RATE: 89 BPM | TEMPERATURE: 98.9 F

## 2024-08-10 DIAGNOSIS — K08.89 PAIN, DENTAL: ICD-10-CM

## 2024-08-10 DIAGNOSIS — K04.7 DENTAL ABSCESS: Primary | ICD-10-CM

## 2024-08-10 PROCEDURE — 96372 THER/PROPH/DIAG INJ SC/IM: CPT

## 2024-08-10 PROCEDURE — 99284 EMERGENCY DEPT VISIT MOD MDM: CPT

## 2024-08-10 PROCEDURE — 6370000000 HC RX 637 (ALT 250 FOR IP): Performed by: STUDENT IN AN ORGANIZED HEALTH CARE EDUCATION/TRAINING PROGRAM

## 2024-08-10 PROCEDURE — 6360000002 HC RX W HCPCS: Performed by: STUDENT IN AN ORGANIZED HEALTH CARE EDUCATION/TRAINING PROGRAM

## 2024-08-10 RX ORDER — KETOROLAC TROMETHAMINE 30 MG/ML
30 INJECTION, SOLUTION INTRAMUSCULAR; INTRAVENOUS ONCE
Status: COMPLETED | OUTPATIENT
Start: 2024-08-10 | End: 2024-08-10

## 2024-08-10 RX ORDER — OXYCODONE HYDROCHLORIDE AND ACETAMINOPHEN 5; 325 MG/1; MG/1
1 TABLET ORAL EVERY 8 HOURS PRN
Qty: 5 TABLET | Refills: 0 | Status: SHIPPED | OUTPATIENT
Start: 2024-08-10 | End: 2024-08-10

## 2024-08-10 RX ORDER — CLINDAMYCIN HYDROCHLORIDE 300 MG/1
300 CAPSULE ORAL 4 TIMES DAILY
Qty: 28 CAPSULE | Refills: 0 | Status: SHIPPED | OUTPATIENT
Start: 2024-08-10 | End: 2024-08-10

## 2024-08-10 RX ORDER — CLINDAMYCIN HYDROCHLORIDE 300 MG/1
300 CAPSULE ORAL 4 TIMES DAILY
Qty: 28 CAPSULE | Refills: 0 | Status: SHIPPED | OUTPATIENT
Start: 2024-08-10 | End: 2024-08-17

## 2024-08-10 RX ORDER — OXYCODONE HYDROCHLORIDE AND ACETAMINOPHEN 5; 325 MG/1; MG/1
1 TABLET ORAL EVERY 8 HOURS PRN
Qty: 5 TABLET | Refills: 0 | Status: SHIPPED | OUTPATIENT
Start: 2024-08-10 | End: 2024-08-13

## 2024-08-10 RX ORDER — CLINDAMYCIN HYDROCHLORIDE 150 MG/1
300 CAPSULE ORAL ONCE
Status: COMPLETED | OUTPATIENT
Start: 2024-08-10 | End: 2024-08-10

## 2024-08-10 RX ORDER — IBUPROFEN 600 MG/1
600 TABLET ORAL EVERY 6 HOURS PRN
Qty: 20 TABLET | Refills: 0 | Status: SHIPPED | OUTPATIENT
Start: 2024-08-10 | End: 2024-08-10

## 2024-08-10 RX ORDER — IBUPROFEN 600 MG/1
600 TABLET ORAL EVERY 6 HOURS PRN
Qty: 20 TABLET | Refills: 0 | Status: SHIPPED | OUTPATIENT
Start: 2024-08-10

## 2024-08-10 RX ADMIN — KETOROLAC TROMETHAMINE 30 MG: 30 INJECTION, SOLUTION INTRAMUSCULAR at 14:31

## 2024-08-10 RX ADMIN — CLINDAMYCIN HYDROCHLORIDE 300 MG: 150 CAPSULE ORAL at 14:32

## 2024-08-10 ASSESSMENT — ENCOUNTER SYMPTOMS
FACIAL SWELLING: 1
TROUBLE SWALLOWING: 0
SORE THROAT: 0

## 2024-08-10 ASSESSMENT — PAIN DESCRIPTION - ORIENTATION: ORIENTATION: RIGHT;UPPER

## 2024-08-10 ASSESSMENT — PAIN DESCRIPTION - LOCATION: LOCATION: TEETH

## 2024-08-10 ASSESSMENT — PAIN DESCRIPTION - DESCRIPTORS: DESCRIPTORS: DISCOMFORT;ACHING

## 2024-08-10 ASSESSMENT — PAIN SCALES - GENERAL: PAINLEVEL_OUTOF10: 9

## 2024-08-10 NOTE — ED PROVIDER NOTES
[facility] EMERGENCY DEPARTMENT  EMERGENCY DEPARTMENT ENCOUNTER        Pt Name: Elmo Hayden  MRN: [id number]  Birthdate 1970  Date of evaluation: 8/10/2024  Provider: Marycarmen Bustos DO  PCP: Santi Manrique DO  Note Started: 2:26 PM EDT 8/10/24    CHIEF COMPLAINT       Chief Complaint   Patient presents with    Dental Problem     Chipped upper right tooth last week, now painful and facial swelling       HISTORY OF PRESENT ILLNESS: 1 or more Elements     Limitations to history : None    Elmo Hayden is a 54 y.o. male who presents the emergency department for evaluation of dental pain at the right posterior upper aspect, starting about 1.5 weeks ago.  Patient says that he was eating something and felt like he chipped a tooth at the back right upper row, ever since then he has had gradual worsening swelling and pain of the gumline, and today felt like his cheek was swollen as well.  No fevers or chills.  No chest pain palpitations or shortness of breath.  No oral swelling, no difficulty speaking or swallowing.  He does not have a dentist and denies any significant history of dental problems.      Nursing Notes were all reviewed and agreed with or any disagreements were addressed in the HPI.      REVIEW OF EXTERNAL NOTE :       None    Chart Review/External Note Review    Last Echo reviewed by Me:  Lab Results   Component Value Date    LVEF 65 11/09/2022           Controlled Substance Monitoring:    Acute and Chronic Pain Monitoring:   RX Monitoring Attestation   11/3/2016   2:56 PM The Prescription Monitoring Report for this patient was reviewed today.             REVIEW OF SYSTEMS :      Review of Systems   Constitutional:  Negative for chills and fever.   HENT:  Positive for dental problem and facial swelling. Negative for drooling, sore throat and trouble swallowing.        Pertinent positives and negatives are stated within HPI or above, all other systems reviewed and  upper right back with.   Please see HPI for further details, additional history and chart review.   On my evaluation today, patient is alert, oriented, nontoxic in appearance, no acute distress.  Vitals are stable.    Exam findings are as documented above; Mild dental caries at approximately teeth #1 through 3, with adjacent gum swelling and at the soft tissue just above the gumline there is a focal area of purulence concerning for small abscess.  External to this area on his cheek there is very mild cheek swelling without redness or warmth.  Oropharynx is otherwise clear and patent without drooling stridor or trismus.  Suspect dental abscess.  Recommend course of antibiotics and outpatient follow-up with the [facility] dental clinic.  Patient was treated with initial dose of clindamycin here, due to an allergy to penicillin (he says he is actually never taken penicillin to his knowledge but was told he was allergic to it as a child?); he was also treated with IM Toradol 30 mg and provided prescription for ibuprofen 600 mg to be taken every 6 hours as needed to be taken with food, a short course of Percocet 5 tablets to be taken as needed for severe pain.  Clindamycin 4 times daily for 7 days.  He was referred to the [facility] dental clinic to follow-up.  I otherwise feel he is stable and appropriate for discharge.  Plan discussed, patient voiced understanding and is amenable.  Strict return precautions were given.      While not exhaustive, the following diagnoses and their severity were considered: Dental abscess, pain due to dental caries.      Independent interpretation of Laboratory tests by Marycarmen Bustos, DO: None.    Independent interpretation of Radiology tests by Marycarmen Bustos DO: none         Non-plain film images such as CT, Ultrasound and MRI are read by the radiologist. Plain radiographic images are visualized and preliminarily interpreted by the ED Provider with the above-noted

## 2024-08-10 NOTE — DISCHARGE INSTRUCTIONS
Please return to the ER for any new or worsening symptoms including but not limited to Fever  If prescribed, please be sure to  your prescriptions from the pharmacy  Please follow-up with  the [facility] dental clinic  as instructed

## 2024-09-18 ENCOUNTER — HOSPITAL ENCOUNTER (EMERGENCY)
Age: 54
Discharge: HOME | End: 2024-09-18
Payer: MEDICAID

## 2024-09-18 VITALS
RESPIRATION RATE: 16 BRPM | HEIGHT: 69 IN | DIASTOLIC BLOOD PRESSURE: 86 MMHG | WEIGHT: 140 LBS | BODY MASS INDEX: 20.73 KG/M2 | SYSTOLIC BLOOD PRESSURE: 165 MMHG | TEMPERATURE: 97.8 F | HEART RATE: 63 BPM | OXYGEN SATURATION: 100 %

## 2024-09-18 DIAGNOSIS — K04.7 DENTAL INFECTION: Primary | ICD-10-CM

## 2024-09-18 DIAGNOSIS — K08.89 PAIN, DENTAL: ICD-10-CM

## 2024-09-18 PROCEDURE — 99283 EMERGENCY DEPT VISIT LOW MDM: CPT

## 2024-09-18 PROCEDURE — 6370000000 HC RX 637 (ALT 250 FOR IP): Performed by: PHYSICIAN ASSISTANT

## 2024-09-18 RX ORDER — CLINDAMYCIN HCL 150 MG
450 CAPSULE ORAL ONCE
Status: COMPLETED | OUTPATIENT
Start: 2024-09-18 | End: 2024-09-18

## 2024-09-18 RX ORDER — ACETAMINOPHEN 500 MG
1000 TABLET ORAL ONCE
Status: COMPLETED | OUTPATIENT
Start: 2024-09-18 | End: 2024-09-18

## 2024-09-18 RX ORDER — CLINDAMYCIN HCL 150 MG
450 CAPSULE ORAL 3 TIMES DAILY
Qty: 90 CAPSULE | Refills: 0 | Status: SHIPPED | OUTPATIENT
Start: 2024-09-18 | End: 2024-09-28

## 2024-09-18 RX ORDER — IBUPROFEN 800 MG/1
800 TABLET, FILM COATED ORAL EVERY 6 HOURS PRN
Qty: 20 TABLET | Refills: 0 | Status: SHIPPED | OUTPATIENT
Start: 2024-09-18 | End: 2024-09-23

## 2024-09-18 RX ADMIN — CLINDAMYCIN HYDROCHLORIDE 450 MG: 150 CAPSULE ORAL at 16:26

## 2024-09-18 RX ADMIN — ACETAMINOPHEN 1000 MG: 500 TABLET ORAL at 16:26

## 2024-09-18 ASSESSMENT — PAIN SCALES - GENERAL: PAINLEVEL_OUTOF10: 9

## 2024-09-18 ASSESSMENT — PAIN DESCRIPTION - DESCRIPTORS: DESCRIPTORS: ACHING

## 2024-09-18 ASSESSMENT — PAIN DESCRIPTION - ORIENTATION: ORIENTATION: RIGHT;UPPER

## 2024-09-18 ASSESSMENT — LIFESTYLE VARIABLES
HOW MANY STANDARD DRINKS CONTAINING ALCOHOL DO YOU HAVE ON A TYPICAL DAY: PATIENT DOES NOT DRINK
HOW OFTEN DO YOU HAVE A DRINK CONTAINING ALCOHOL: NEVER

## 2024-09-18 ASSESSMENT — PAIN DESCRIPTION - LOCATION: LOCATION: TEETH

## 2024-09-18 ASSESSMENT — PAIN - FUNCTIONAL ASSESSMENT: PAIN_FUNCTIONAL_ASSESSMENT: 0-10
